# Patient Record
Sex: MALE | Race: WHITE | NOT HISPANIC OR LATINO | Employment: UNEMPLOYED | RURAL
[De-identification: names, ages, dates, MRNs, and addresses within clinical notes are randomized per-mention and may not be internally consistent; named-entity substitution may affect disease eponyms.]

---

## 2020-03-20 ENCOUNTER — HISTORICAL (OUTPATIENT)
Dept: ADMINISTRATIVE | Facility: HOSPITAL | Age: 55
End: 2020-03-20

## 2022-08-02 ENCOUNTER — LAB VISIT (OUTPATIENT)
Dept: LAB | Facility: HOSPITAL | Age: 57
End: 2022-08-02
Attending: NURSE PRACTITIONER
Payer: COMMERCIAL

## 2022-08-02 DIAGNOSIS — R53.83 FATIGUE, UNSPECIFIED TYPE: ICD-10-CM

## 2022-08-02 DIAGNOSIS — I10 HYPERTENSION, UNSPECIFIED TYPE: Primary | ICD-10-CM

## 2022-08-02 LAB
25(OH)D3 SERPL-MCNC: 32.8 NG/ML
ALBUMIN SERPL BCP-MCNC: 4.1 G/DL (ref 3.5–5)
ALBUMIN/GLOB SERPL: 0.8 {RATIO}
ALP SERPL-CCNC: 93 U/L (ref 45–115)
ALT SERPL W P-5'-P-CCNC: 130 U/L (ref 16–61)
ANION GAP SERPL CALCULATED.3IONS-SCNC: 14 MMOL/L (ref 7–16)
AST SERPL W P-5'-P-CCNC: 64 U/L (ref 15–37)
BASOPHILS # BLD AUTO: 0.04 K/UL (ref 0–0.2)
BASOPHILS NFR BLD AUTO: 0.4 % (ref 0–1)
BILIRUB SERPL-MCNC: 0.7 MG/DL (ref 0–1.2)
BUN SERPL-MCNC: 17 MG/DL (ref 7–18)
BUN/CREAT SERPL: 19 (ref 6–20)
CALCIUM SERPL-MCNC: 9.3 MG/DL (ref 8.5–10.1)
CHLORIDE SERPL-SCNC: 99 MMOL/L (ref 98–107)
CHOLEST SERPL-MCNC: 181 MG/DL (ref 0–200)
CHOLEST/HDLC SERPL: 3.9 {RATIO}
CO2 SERPL-SCNC: 29 MMOL/L (ref 21–32)
CREAT SERPL-MCNC: 0.91 MG/DL (ref 0.7–1.3)
DIFFERENTIAL METHOD BLD: ABNORMAL
EOSINOPHIL # BLD AUTO: 0.21 K/UL (ref 0–0.5)
EOSINOPHIL NFR BLD AUTO: 2 % (ref 1–4)
EOSINOPHIL NFR BLD MANUAL: 1 % (ref 1–4)
ERYTHROCYTE [DISTWIDTH] IN BLOOD BY AUTOMATED COUNT: 13.4 % (ref 11.5–14.5)
EST. AVERAGE GLUCOSE BLD GHB EST-MCNC: 254 MG/DL
GLOBULIN SER-MCNC: 4.9 G/DL (ref 2–4)
GLUCOSE SERPL-MCNC: 279 MG/DL (ref 74–106)
HBA1C MFR BLD HPLC: 10.2 % (ref 4.5–6.6)
HCT VFR BLD AUTO: 48.7 % (ref 40–54)
HDLC SERPL-MCNC: 46 MG/DL (ref 40–60)
HGB BLD-MCNC: 17.4 G/DL (ref 13.5–18)
LDLC SERPL CALC-MCNC: 104 MG/DL
LDLC/HDLC SERPL: 2.3 {RATIO}
LYMPHOCYTES # BLD AUTO: 2.74 K/UL (ref 1–4.8)
LYMPHOCYTES NFR BLD AUTO: 26.7 % (ref 27–41)
LYMPHOCYTES NFR BLD MANUAL: 23 % (ref 27–41)
MCH RBC QN AUTO: 28.7 PG (ref 27–31)
MCHC RBC AUTO-ENTMCNC: 35.7 G/DL (ref 32–36)
MCV RBC AUTO: 80.4 FL (ref 80–96)
MONOCYTES # BLD AUTO: 0.67 K/UL (ref 0–0.8)
MONOCYTES NFR BLD AUTO: 6.5 % (ref 2–6)
MONOCYTES NFR BLD MANUAL: 6 % (ref 2–6)
MPC BLD CALC-MCNC: 10.3 FL (ref 9.4–12.4)
NEUTROPHILS # BLD AUTO: 6.62 K/UL (ref 1.8–7.7)
NEUTROPHILS NFR BLD AUTO: 64.4 % (ref 53–65)
NEUTS SEG NFR BLD MANUAL: 70 % (ref 50–62)
NONHDLC SERPL-MCNC: 135 MG/DL
NRBC BLD MANUAL-RTO: ABNORMAL %
PLATELET # BLD AUTO: 223 K/UL (ref 150–400)
PLATELET MORPHOLOGY: NORMAL
POTASSIUM SERPL-SCNC: 4.1 MMOL/L (ref 3.5–5.1)
PROT SERPL-MCNC: 9 G/DL (ref 6.4–8.2)
RBC # BLD AUTO: 6.06 M/UL (ref 4.6–6.2)
RBC MORPH BLD: NORMAL
SODIUM SERPL-SCNC: 138 MMOL/L (ref 136–145)
TRIGL SERPL-MCNC: 157 MG/DL (ref 35–150)
TSH SERPL DL<=0.005 MIU/L-ACNC: 1.05 UIU/ML (ref 0.36–3.74)
VIT B12 SERPL-MCNC: 386 PG/ML (ref 193–986)
VLDLC SERPL-MCNC: 31 MG/DL
WBC # BLD AUTO: 10.28 K/UL (ref 4.5–11)

## 2022-08-02 PROCEDURE — 80061 LIPID PANEL: CPT

## 2022-08-02 PROCEDURE — 80053 COMPREHEN METABOLIC PANEL: CPT

## 2022-08-02 PROCEDURE — 36415 COLL VENOUS BLD VENIPUNCTURE: CPT

## 2022-08-02 PROCEDURE — 84443 ASSAY THYROID STIM HORMONE: CPT

## 2022-08-02 PROCEDURE — 82607 VITAMIN B-12: CPT

## 2022-08-02 PROCEDURE — 82306 VITAMIN D 25 HYDROXY: CPT

## 2022-08-02 PROCEDURE — 83036 HEMOGLOBIN GLYCOSYLATED A1C: CPT

## 2022-08-02 PROCEDURE — 85025 COMPLETE CBC W/AUTO DIFF WBC: CPT

## 2023-12-25 ENCOUNTER — HOSPITAL ENCOUNTER (EMERGENCY)
Facility: HOSPITAL | Age: 58
Discharge: HOME | End: 2023-12-25

## 2023-12-25 VITALS
OXYGEN SATURATION: 97 % | BODY MASS INDEX: 31.34 KG/M2 | DIASTOLIC BLOOD PRESSURE: 91 MMHG | HEART RATE: 77 BPM | RESPIRATION RATE: 18 BRPM | TEMPERATURE: 99 F | SYSTOLIC BLOOD PRESSURE: 135 MMHG | WEIGHT: 195 LBS | HEIGHT: 66 IN

## 2023-12-25 DIAGNOSIS — K80.20 CALCULUS OF GALLBLADDER WITHOUT CHOLECYSTITIS WITHOUT OBSTRUCTION: ICD-10-CM

## 2023-12-25 DIAGNOSIS — J20.9 ACUTE BRONCHITIS, UNSPECIFIED ORGANISM: ICD-10-CM

## 2023-12-25 DIAGNOSIS — K59.00 CONSTIPATION, UNSPECIFIED CONSTIPATION TYPE: ICD-10-CM

## 2023-12-25 DIAGNOSIS — R10.13 EPIGASTRIC ABDOMINAL PAIN: Primary | ICD-10-CM

## 2023-12-25 DIAGNOSIS — I10 HYPERTENSION: ICD-10-CM

## 2023-12-25 LAB
ALBUMIN SERPL BCP-MCNC: 3.3 G/DL (ref 3.5–5)
ALBUMIN/GLOB SERPL: 0.8 {RATIO}
ALP SERPL-CCNC: 72 U/L (ref 45–115)
ALT SERPL W P-5'-P-CCNC: 52 U/L (ref 16–61)
AMPHET UR QL SCN: POSITIVE
AMYLASE SERPL-CCNC: 29 U/L (ref 25–115)
ANION GAP SERPL CALCULATED.3IONS-SCNC: 13 MMOL/L (ref 7–16)
AST SERPL W P-5'-P-CCNC: 26 U/L (ref 15–37)
BACTERIA #/AREA URNS HPF: ABNORMAL /HPF
BARBITURATES UR QL SCN: NEGATIVE
BASOPHILS # BLD AUTO: 0.05 K/UL (ref 0–0.2)
BASOPHILS NFR BLD AUTO: 0.5 % (ref 0–1)
BENZODIAZ METAB UR QL SCN: NEGATIVE
BILIRUB SERPL-MCNC: 0.5 MG/DL (ref ?–1.2)
BILIRUB UR QL STRIP: NEGATIVE
BUN SERPL-MCNC: 14 MG/DL (ref 7–18)
BUN/CREAT SERPL: 14 (ref 6–20)
CALCIUM SERPL-MCNC: 8.4 MG/DL (ref 8.5–10.1)
CANNABINOIDS UR QL SCN: NEGATIVE
CHLORIDE SERPL-SCNC: 104 MMOL/L (ref 98–107)
CLARITY UR: CLEAR
CO2 SERPL-SCNC: 25 MMOL/L (ref 21–32)
COCAINE UR QL SCN: NEGATIVE
COLOR UR: YELLOW
CREAT SERPL-MCNC: 1.01 MG/DL (ref 0.7–1.3)
DIFFERENTIAL METHOD BLD: ABNORMAL
EGFR (NO RACE VARIABLE) (RUSH/TITUS): 87 ML/MIN/1.73M2
EOSINOPHIL # BLD AUTO: 0.34 K/UL (ref 0–0.5)
EOSINOPHIL NFR BLD AUTO: 3.4 % (ref 1–4)
ERYTHROCYTE [DISTWIDTH] IN BLOOD BY AUTOMATED COUNT: 13.4 % (ref 11.5–14.5)
ETHANOL SERPL-MCNC: 4 MG/DL
GLOBULIN SER-MCNC: 4.1 G/DL (ref 2–4)
GLUCOSE SERPL-MCNC: 165 MG/DL (ref 70–105)
GLUCOSE SERPL-MCNC: 199 MG/DL (ref 74–106)
GLUCOSE UR STRIP-MCNC: 500 MG/DL
HCT VFR BLD AUTO: 50 % (ref 40–54)
HGB BLD-MCNC: 16.5 G/DL (ref 13.5–18)
KETONES UR STRIP-SCNC: NEGATIVE MG/DL
LACTATE SERPL-SCNC: 1.7 MMOL/L (ref 0.4–2)
LACTATE SERPL-SCNC: 2.3 MMOL/L (ref 0.4–2)
LEUKOCYTE ESTERASE UR QL STRIP: NEGATIVE
LIPASE SERPL-CCNC: 40 U/L (ref 16–77)
LYMPHOCYTES # BLD AUTO: 2.62 K/UL (ref 1–4.8)
LYMPHOCYTES NFR BLD AUTO: 26.2 % (ref 27–41)
MAGNESIUM SERPL-MCNC: 2 MG/DL (ref 1.7–2.3)
MCH RBC QN AUTO: 27.5 PG (ref 27–31)
MCHC RBC AUTO-ENTMCNC: 33 G/DL (ref 32–36)
MCV RBC AUTO: 83.5 FL (ref 80–96)
MONOCYTES # BLD AUTO: 0.95 K/UL (ref 0–0.8)
MONOCYTES NFR BLD AUTO: 9.5 % (ref 2–6)
MPC BLD CALC-MCNC: 10.2 FL (ref 9.4–12.4)
MUCOUS THREADS #/AREA URNS HPF: ABNORMAL /HPF
NEUTROPHILS # BLD AUTO: 6.05 K/UL (ref 1.8–7.7)
NEUTROPHILS NFR BLD AUTO: 60.4 % (ref 53–65)
NITRITE UR QL STRIP: NEGATIVE
OPIATES UR QL SCN: POSITIVE
PCP UR QL SCN: NEGATIVE
PH UR STRIP: 5.5 PH UNITS
PLATELET # BLD AUTO: 229 K/UL (ref 150–400)
POTASSIUM SERPL-SCNC: 3.7 MMOL/L (ref 3.5–5.1)
PROT SERPL-MCNC: 7.4 G/DL (ref 6.4–8.2)
PROT UR QL STRIP: NEGATIVE
RBC # BLD AUTO: 5.99 M/UL (ref 4.6–6.2)
RBC # UR STRIP: ABNORMAL /UL
RBC #/AREA URNS HPF: ABNORMAL /HPF
SODIUM SERPL-SCNC: 138 MMOL/L (ref 136–145)
SP GR UR STRIP: 1.01
SQUAMOUS #/AREA URNS LPF: ABNORMAL /LPF
TROPONIN I SERPL DL<=0.01 NG/ML-MCNC: 5 PG/ML
TROPONIN I SERPL DL<=0.01 NG/ML-MCNC: 6.7 PG/ML
UROBILINOGEN UR STRIP-ACNC: 0.2 MG/DL
WBC # BLD AUTO: 10.01 K/UL (ref 4.5–11)
WBC #/AREA URNS HPF: ABNORMAL /HPF

## 2023-12-25 PROCEDURE — 94761 N-INVAS EAR/PLS OXIMETRY MLT: CPT

## 2023-12-25 PROCEDURE — 81001 URINALYSIS AUTO W/SCOPE: CPT

## 2023-12-25 PROCEDURE — 25500020 PHARM REV CODE 255

## 2023-12-25 PROCEDURE — 80307 DRUG TEST PRSMV CHEM ANLYZR: CPT

## 2023-12-25 PROCEDURE — 99284 PR EMERGENCY DEPT VISIT,LEVEL IV: ICD-10-PCS | Mod: ,,, | Performed by: NURSE PRACTITIONER

## 2023-12-25 PROCEDURE — 99284 EMERGENCY DEPT VISIT MOD MDM: CPT | Mod: ,,, | Performed by: NURSE PRACTITIONER

## 2023-12-25 PROCEDURE — 63600175 PHARM REV CODE 636 W HCPCS

## 2023-12-25 PROCEDURE — 82962 GLUCOSE BLOOD TEST: CPT

## 2023-12-25 PROCEDURE — 93010 EKG 12-LEAD: ICD-10-PCS | Mod: ,,, | Performed by: HOSPITALIST

## 2023-12-25 PROCEDURE — 93010 ELECTROCARDIOGRAM REPORT: CPT | Mod: ,,, | Performed by: HOSPITALIST

## 2023-12-25 PROCEDURE — 83605 ASSAY OF LACTIC ACID: CPT

## 2023-12-25 PROCEDURE — 82077 ASSAY SPEC XCP UR&BREATH IA: CPT

## 2023-12-25 PROCEDURE — 80053 COMPREHEN METABOLIC PANEL: CPT

## 2023-12-25 PROCEDURE — 96361 HYDRATE IV INFUSION ADD-ON: CPT

## 2023-12-25 PROCEDURE — 84484 ASSAY OF TROPONIN QUANT: CPT

## 2023-12-25 PROCEDURE — 82150 ASSAY OF AMYLASE: CPT

## 2023-12-25 PROCEDURE — 25000003 PHARM REV CODE 250

## 2023-12-25 PROCEDURE — 83690 ASSAY OF LIPASE: CPT

## 2023-12-25 PROCEDURE — 85025 COMPLETE CBC W/AUTO DIFF WBC: CPT

## 2023-12-25 PROCEDURE — 99285 EMERGENCY DEPT VISIT HI MDM: CPT | Mod: 25

## 2023-12-25 PROCEDURE — 83735 ASSAY OF MAGNESIUM: CPT

## 2023-12-25 PROCEDURE — 96374 THER/PROPH/DIAG INJ IV PUSH: CPT

## 2023-12-25 PROCEDURE — C9113 INJ PANTOPRAZOLE SODIUM, VIA: HCPCS

## 2023-12-25 PROCEDURE — 96375 TX/PRO/DX INJ NEW DRUG ADDON: CPT

## 2023-12-25 PROCEDURE — 93005 ELECTROCARDIOGRAM TRACING: CPT

## 2023-12-25 RX ORDER — ONDANSETRON 2 MG/ML
4 INJECTION INTRAMUSCULAR; INTRAVENOUS
Status: COMPLETED | OUTPATIENT
Start: 2023-12-25 | End: 2023-12-25

## 2023-12-25 RX ORDER — ONDANSETRON 4 MG/1
4 TABLET, ORALLY DISINTEGRATING ORAL EVERY 8 HOURS PRN
Qty: 10 TABLET | Refills: 0 | Status: SHIPPED | OUTPATIENT
Start: 2023-12-25

## 2023-12-25 RX ORDER — MORPHINE SULFATE 4 MG/ML
4 INJECTION, SOLUTION INTRAMUSCULAR; INTRAVENOUS
Status: COMPLETED | OUTPATIENT
Start: 2023-12-25 | End: 2023-12-25

## 2023-12-25 RX ORDER — LISINOPRIL 20 MG/1
20 TABLET ORAL DAILY
COMMUNITY

## 2023-12-25 RX ORDER — ALBUTEROL SULFATE 90 UG/1
1-2 AEROSOL, METERED RESPIRATORY (INHALATION) EVERY 4 HOURS PRN
Qty: 18 G | Refills: 0 | Status: SHIPPED | OUTPATIENT
Start: 2023-12-25 | End: 2024-12-24

## 2023-12-25 RX ORDER — PANTOPRAZOLE SODIUM 40 MG/10ML
40 INJECTION, POWDER, LYOPHILIZED, FOR SOLUTION INTRAVENOUS
Status: COMPLETED | OUTPATIENT
Start: 2023-12-25 | End: 2023-12-25

## 2023-12-25 RX ORDER — METFORMIN HYDROCHLORIDE 500 MG/1
500 TABLET ORAL 2 TIMES DAILY WITH MEALS
COMMUNITY

## 2023-12-25 RX ORDER — HYDROMORPHONE HYDROCHLORIDE 2 MG/ML
1 INJECTION, SOLUTION INTRAMUSCULAR; INTRAVENOUS; SUBCUTANEOUS
Status: COMPLETED | OUTPATIENT
Start: 2023-12-25 | End: 2023-12-25

## 2023-12-25 RX ADMIN — ONDANSETRON 4 MG: 2 INJECTION INTRAMUSCULAR; INTRAVENOUS at 05:12

## 2023-12-25 RX ADMIN — MORPHINE SULFATE 4 MG: 4 INJECTION, SOLUTION INTRAMUSCULAR; INTRAVENOUS at 05:12

## 2023-12-25 RX ADMIN — PANTOPRAZOLE SODIUM 40 MG: 40 INJECTION, POWDER, LYOPHILIZED, FOR SOLUTION INTRAVENOUS at 05:12

## 2023-12-25 RX ADMIN — SODIUM CHLORIDE 1000 ML: 9 INJECTION, SOLUTION INTRAVENOUS at 05:12

## 2023-12-25 RX ADMIN — HYDROMORPHONE HYDROCHLORIDE 1 MG: 2 INJECTION INTRAMUSCULAR; INTRAVENOUS; SUBCUTANEOUS at 05:12

## 2023-12-25 RX ADMIN — IOPAMIDOL 100 ML: 755 INJECTION, SOLUTION INTRAVENOUS at 06:12

## 2023-12-25 NOTE — ED PROVIDER NOTES
Encounter Date: 12/25/2023       History     Chief Complaint   Patient presents with    Abdominal Pain    Nausea    Shortness of Breath     Patient is a 58-year-old white male brought to the emergency department for evaluation of abdominal pain, nausea, shortness of breath that began approximately 2 hours prior to arrival.  He reports a similar episode a couple of weeks prior that resolved spontaneously 1 day later.  He did not seek evaluation at that time.  He describes the pain as a burning in his epigastric area that radiates downward.  He did attempt Mylanta prior to arrival to the ED with no relief.  He has a past medical history of diabetes and hypertension which are managed with daily medications.  He reports compliance with his medications.  Reports normal BM yesterday.  He does appear to be distressed.  His blood pressure is elevated at 206/137 in his right arm  and 198/135 in his left arm, heart rate is noted to be 66, respirations 22, and oxygen saturation 100% on room air.  He is afebrile with a temperature of 98.8°. Last PO intake was mylanta which the patient has been consuming since the onset of symptoms but last meal prior to that was chicken pot pie around midnight per patient.    The history is provided by the patient and a relative.     Review of patient's allergies indicates:   Allergen Reactions    Pcn [penicillins]      Past Medical History:   Diagnosis Date    Diabetes mellitus     Hypertension      History reviewed. No pertinent surgical history.  History reviewed. No pertinent family history.  Social History     Tobacco Use    Smoking status: Every Day     Current packs/day: 1.00     Types: Cigarettes    Smokeless tobacco: Never   Substance Use Topics    Drug use: Not Currently     Review of Systems   Constitutional:  Negative for activity change, appetite change, chills and fever.   HENT:  Negative for congestion and sore throat.    Eyes: Negative.    Respiratory:  Positive for shortness of  breath (when pain worsens). Negative for cough.    Cardiovascular:  Negative for chest pain.   Gastrointestinal:  Positive for abdominal distention (mild), abdominal pain (burning) and nausea. Negative for constipation, diarrhea and vomiting.   Endocrine: Negative.    Genitourinary:  Negative for dysuria, flank pain and frequency.   Musculoskeletal:  Negative for arthralgias, back pain, neck pain and neck stiffness.   Skin:  Negative for rash.   Allergic/Immunologic: Negative.    Neurological:  Negative for dizziness, facial asymmetry, speech difficulty, weakness, light-headedness, numbness and headaches.   Hematological:  Does not bruise/bleed easily.   Psychiatric/Behavioral:  Positive for agitation. Negative for confusion and hallucinations.    All other systems reviewed and are negative.      Physical Exam     Initial Vitals   BP Pulse Resp Temp SpO2   12/25/23 0452 12/25/23 0452 12/25/23 0452 12/25/23 0516 12/25/23 0452   (!) 206/137 66 (!) 24 98.8 °F (37.1 °C) 100 %      MAP       --                Physical Exam    Nursing note and vitals reviewed.  Constitutional: He appears well-developed and well-nourished.   HENT:   Head: Normocephalic and atraumatic.   Eyes: Conjunctivae and EOM are normal. Pupils are equal, round, and reactive to light.   Neck: Neck supple.   Normal range of motion.  Cardiovascular:  Normal rate, regular rhythm, normal heart sounds and intact distal pulses.           Pulmonary/Chest: Breath sounds normal. No respiratory distress. He has no wheezes. He has no rhonchi. He has no rales. He exhibits no tenderness.   Abdominal: Bowel sounds are normal. He exhibits distension (mild). There is abdominal tenderness.   Musculoskeletal:         General: Normal range of motion.      Cervical back: Normal range of motion and neck supple.     Neurological: He is alert and oriented to person, place, and time. He has normal strength. GCS score is 15. GCS eye subscore is 4. GCS verbal subscore is 5. GCS  motor subscore is 6.   Skin: Skin is warm and dry. Capillary refill takes less than 2 seconds.   Psychiatric: He has a normal mood and affect. His behavior is normal. Judgment and thought content normal.         Medical Screening Exam   See Full Note    ED Course   Procedures  Labs Reviewed   COMPREHENSIVE METABOLIC PANEL - Abnormal; Notable for the following components:       Result Value    Glucose 199 (*)     Calcium 8.4 (*)     Albumin 3.3 (*)     Globulin 4.1 (*)     All other components within normal limits   URINALYSIS, REFLEX TO URINE CULTURE - Abnormal; Notable for the following components:    Glucose,  (*)     Blood, UA Trace-Intact (*)     All other components within normal limits   DRUG SCREEN, URINE (BEAKER) - Abnormal; Notable for the following components:    Opiates, Urine Positive (*)     Amphetamine, Urine Positive (*)     All other components within normal limits   LACTIC ACID, PLASMA - Abnormal; Notable for the following components:    Lactic Acid 2.3 (*)     All other components within normal limits   CBC WITH DIFFERENTIAL - Abnormal; Notable for the following components:    Lymphocytes % 26.2 (*)     Monocytes % 9.5 (*)     Monocytes, Absolute 0.95 (*)     All other components within normal limits   URINALYSIS, MICROSCOPIC - Abnormal; Notable for the following components:    RBC, UA 3-5 (*)     Bacteria, UA Few (*)     Squamous Epithelial Cells, UA Few (*)     Mucus, UA Few (*)     All other components within normal limits   POCT GLUCOSE MONITORING CONTINUOUS - Abnormal; Notable for the following components:    POC Glucose 165 (*)     All other components within normal limits   AMYLASE - Normal   LIPASE - Normal   MAGNESIUM - Normal   TROPONIN I - Normal   ALCOHOL,MEDICAL (ETHANOL) - Normal   LACTIC ACID, PLASMA - Normal   TROPONIN I - Normal   CBC W/ AUTO DIFFERENTIAL    Narrative:     The following orders were created for panel order CBC auto differential.  Procedure                                Abnormality         Status                     ---------                               -----------         ------                     CBC with Differential[[id number]]       Abnormal            Final result                 Please view results for these tests on the individual orders.   POCT GLUCOSE MONITORING CONTINUOUS          Imaging Results              CT Abdomen Pelvis With IV Contrast NO Oral Contrast (Final result)  Result time 12/25/23 09:00:38      Final result by Kranthi Salcido MD (12/25/23 09:00:38)                   Impression:      1. No acute abnormality within the abdomen or pelvis to explain patient's reported complaints.  Mild fecal stasis/constipation changes are suspected.    2.  Cholelithiasis with no CT evidence of acute cholecystitis.    Place of service: [facility]      Electronically signed by: Kranthi Salcido  Date:    12/25/2023  Time:    09:00               Narrative:    EXAMINATION  CT ABDOMEN PELVIS WITH IV CONTRAST    CLINICAL HISTORY:  Epigastric pain;    TECHNIQUE:  3 mm axial images were obtained from the lung bases through the ischial tuberosities status post administration of 100 cc of Isovue-370. No immediate complication from administration of contrast media. Coronal and sagittal reformatted images were additionally created and submitted for review. Total DLP: 1875 mGy/cm.    Dose reduction:    The CT exam was performed using one or more dose reduction techniques: Automatic exposure control, automated adjustment of the MA and/or kVP according to patient size, or use of iterative reconstruction technique.    COMPARISON:  None    FINDINGS:  Lung bases: Lung bases are clear.  No significant pleural/pericardial effusion.    Abdomen:    Liver and Gallbladder: No abnormal enhancement.  No biliary ductal dilatation.  Gallstones are noted within the dependent gallbladder.  No wall thickening or pericholecystic fluid.  Portal vein is patent.    Spleen: Spleen is mildly  enlarged but otherwise grossly unremarkable as visualized.    Pancreas: Unremarkable    Adrenals: Unremarkable    Kidneys: No abnormal enhancement.  No hydronephrosis or nephrolithiasis peer grossly symmetric excretion of contrast is noted on delayed phase images.    Bowel and Mesentery: No abnormal small bowel dilatation.  No free fluid/air.  Scattered fecal material is noted throughout the colon which is otherwise unremarkable in course/caliber.  The appendix is not definitely identified.  No secondary signs of acute appendicitis.    Retroperitoneum: No enlarged lymph nodes.    Pelvis:    Bladder: No focal abnormality.    Fluid: No free fluid identified.    Lymph nodes: No enlarged lymph nodes.    Pelvic organs: Unremarkable    Osseous structures: No suspicious appearing osseous abnormalities noted.                                       X-Ray Chest AP Portable (Final result)  Result time 12/25/23 08:58:35      Final result by Kranthi Salcido MD (12/25/23 08:58:35)                   Impression:      No definite focal consolidation.  Minimal perihilar infectious/inflammatory infiltrates and/or atelectasis cannot be entirely excluded.  Mild bronchitis or other viral/atypical infectious/inflammatory process is suspected.    Place of service: [facility]      Electronically signed by: Kranthi Salcido  Date:    12/25/2023  Time:    08:58               Narrative:    EXAMINATION:  XR CHEST AP PORTABLE    CLINICAL HISTORY:  epigastric pain;    TECHNIQUE:  Portable    COMPARISON:  Prior radiograph dated 11/23/2018    FINDINGS:  The cardiomediastinal silhouette is within normal limits. Lungs are predominantly clear.  Minimal right perihilar interstitial prominence is noted which is nonspecific.  There is no pneumothorax or pleural effusion.    There is no acute osseous or soft tissue abnormality.                                    X-Rays:   Independently Interpreted Readings:   Chest X-Ray: No definite focal  consolidation.  Minimal perihilar infectious/inflammatory infiltrates and/or atelectasis cannot be entirely excluded.  Mild bronchitis or other viral/atypical infectious/inflammatory process is suspected   Abdomen: 1. No acute abnormality within the abdomen or pelvis to explain patient's reported complaints.  Mild fecal stasis/constipation changes are suspected.     2.  Cholelithiasis with no CT evidence of acute cholecystitis       Medications   sodium chloride 0.9% bolus 1,000 mL 1,000 mL (0 mLs Intravenous Stopped 12/25/23 0600)   morphine injection 4 mg (4 mg Intravenous Given 12/25/23 0512)   ondansetron injection 4 mg (4 mg Intravenous Given 12/25/23 0512)   pantoprazole injection 40 mg (40 mg Intravenous Given 12/25/23 0530)   HYDROmorphone (PF) injection 1 mg (1 mg Intravenous Given 12/25/23 0557)   iopamidoL (ISOVUE-370) injection 100 mL (100 mLs Intravenous Given 12/25/23 0625)     Medical Decision Making  Patient presents for abdominal pain, nausea, and shortness of breath.  He reports sudden onset approximately 2 hours prior to arrival.  He does have a longstanding history of GERD and is experiencing epigastric pain with a sudden onset.  Pain is reported as a burning sensation.  He is tender to palpation in the epigastric region.  He also reports some nausea but has not had any vomiting or diarrhea.  We will obtain an EKG for further evaluation due to the location of the epigastric pain.  We will also initiate IV access and obtain lab specimens for further evaluation.  We will provide him with Zofran 4 mg IV for nausea control and attempt pain control with morphine 4 mg IV.  Differentials include ulcer/perforation so we will also provide the patient with Protonix 40 mg IV.  We will obtain a chest x-ray for initial imaging would likely plans to perform CT scan of the abdomen and pelvis with contrast for further evaluation.    Amount and/or Complexity of Data Reviewed  Independent Historian:      Details:  Patient is a 58-year-old white male brought to the emergency department for evaluation of abdominal pain, nausea, shortness of breath that began approximately 2 hours prior to arrival.  He reports a similar episode a couple of weeks prior that resolved spontaneously 1 day later.  He did not seek evaluation at that time.  He describes the pain as a burning in his epigastric area that radiates downward.  He did attempt Mylanta prior to arrival to the ED with no relief.  He has a past medical history of diabetes and hypertension which are managed with daily medications.  He reports compliance with his medications.  Reports normal BM yesterday.  He does appear to be distressed.  His blood pressure is elevated at 206/137 in his right arm  and 198/135 in his left arm, heart rate is noted to be 66, respirations 22, and oxygen saturation 100% on room air.  He is afebrile with a temperature of 98.8°. Last PO intake was mylanta which the patient has been consuming since the onset of symptoms but last meal prior to that was chicken pot pie around midnight per patient.  Labs: ordered. Decision-making details documented in ED Course.     Details: POCT glucose 165, CBC shows a WBC of 10.01, H&H of 16.5 and 50, platelet count 229, initial lactic acid 2.3 (patient is receiving IV fluids currently), amylase normal at 29, lipase normal at 40, magnesium normal at 2.0, ethanol negative at 4, high sensitivity troponin negative at 6.7, CMP shows a glucose of 199, calcium 8.4, albumin of 3.3, and globulin of 4.1 but otherwise unremarkable.  Normal creatinine of 1.01 with a BUN of 14, and GFR of 87.   Radiology: ordered.     Details: CXR shows no definite focal consolidation.  Minimal perihilar infectious/inflammatory infiltrates and/or atelectasis cannot be entirely excluded.  Mild bronchitis or other viral/atypical infectious/inflammatory process is suspected.    CT abd pelvis with contrast shows evidence of constipation and  "cholelithiasis without cholecystitis.  ECG/medicine tests: ordered.     Details: EKG shows sinus bradycardia at a rate of 59 beats per minute.  No STEMI.    Risk  Prescription drug management.  Risk Details: Ns bolus x 1 liter. MS 4 mg IVP, Ondansetron 4 mg IVP, Dilaudid 1 mg IVP, protonix 40 mg IVP given. Symptoms improved. Discussed findings with pt and wife. Instructed to follow-up with PCP for further evaluation and referral for cholelithiasis. Rx for ondansetron and albuterol MDI. Pt is stable. BP mproved 139/97, 98.8, 75,20, 97% O2 sat. Discharged home with detailed instructions and return precautions provided.               ED Course as of 12/25/23 0929   Mon Dec 25, 2023   0548 Normal creatinine received.  [facility] consulted for approval of CT scan of the abdomen and pelvis with IV contrast.  Approval granted by Dr. Allen. [MC]   0553 Patient informed about the need for imaging utilizing a CT of the abdomen and pelvis with contrast.  He verbalizes understanding and is in agreement with this plan.  He does report minimal improvement in his pain and reports that the pain is still intense at times.  Still described as a burning in his epigastric area.  We will attempt Dilaudid 1 mg IV for pain control at this time. [MC]   0619 Patient returned from CT scan.  Reports improvement in his pain after IV Dilaudid.  Previous pain score was a 10 and now rated at a 5.  Awaiting Radiology reports and urine results. [MC]   0700 Report from LAZARUS Rangel. [DP]   0730 Pt remains alert and oriented. He reports that pain has improved. Denies any n/v. Awaiting CT results. [DP]   0731 Lactate, Mateo: 1.7 [DP]   0731 Troponin I High Sensitivity: 5.0 [DP]   0915 Results discussed with pt and wife. Instructed on need for laxative and follow-up with PCP for GB ultrasound and referral to general surgeon for further evaluation. Explained that pain is likely a "gall bladder attack." Pt reports he had a similar episode of pain about 2 weeks " ago that was not as severe or lengthy. Also discussed CXR. Pt is a smoker. Wife states that he just completed antibiotics for URI last week. Notes has improved except for persistent cough. Pt is afebrile. Rx for ondansetron and albuterol MDI. [DP]      ED Course User Index  [DP] Kandi Santa NP  [MC] Santana Rangel FNP                             Clinical Impression:   Final diagnoses:  [I10] Hypertension  [J20.9] Acute bronchitis, unspecified organism  [K80.20] Calculus of gallbladder without cholecystitis without obstruction  [K59.00] Constipation, unspecified constipation type  [R10.13] Epigastric abdominal pain (Primary)        ED Disposition Condition    Discharge           ED Prescriptions       Medication Sig Dispense Start Date End Date Auth. Provider    ondansetron (ZOFRAN-ODT) 4 MG TbDL Take 1 tablet (4 mg total) by mouth every 8 (eight) hours as needed. 10 tablet 12/25/2023 -- Kandi Santa NP    albuterol (PROVENTIL/VENTOLIN HFA) 90 mcg/actuation inhaler Inhale 1-2 puffs into the lungs every 4 (four) hours as needed for Wheezing (or persistent cough). Rescue 18 g 12/25/2023 12/24/2024 Kandi Santa NP          Follow-up Information       Follow up With Specialties Details Why Contact Info    Delmar Molina FNP-C Family Medicine In 2 days  [address] [facility] [address] [phone]      [facility] - Emergency Department Emergency Medicine  If symptoms worsen [address] [id number] [phone]             Kandi Santa NP  12/25/23 5202

## 2023-12-25 NOTE — DISCHARGE INSTRUCTIONS
Use albuterol inhaler as prescribed for persistent cough or wheezing. Take a laxative of your choosing today and repeat dose tomorrow. Drink plenty of liquids. Since you have gall stones and your pain may have been due to the stones, do not eat any fatty or fried foods. Follow-up with your PCP in 1-2 days for referral and ultrasound. Return to the ED for new or worsening symptoms.

## 2023-12-25 NOTE — LETTER
Patient: Fredy Garcia  YOB: 1965  Date: 12/25/2023 Time: 8:49 AM  Location: [facility] - Emergency Department    Leaving the Hospital Against Medical Advice    Chart #:[id number]    This will certify that I, the undersigned,    ______________________________________________________________________    A patient in the above named medical center, having requested discharge and removal from the medical [place] against the advice of my attending physician(s), hereby release [facility], its physicians, officers and employees, severally and individually, from any and all liability of any nature whatsoever for any injury or harm or complication of any kind that may result directly or indirectly, by reason of my terminating my stay as a patient at [facility] - Emergency Department and my departure from [facility], and hereby waive any and all rights of action I may now have or later acquire as a result of my voluntary departure from [facility] and the termination of my stay as a patient therein.    This release is made with the full knowledge of the danger that may result from the action which I am taking.      Date:_______________________                         ___________________________                                                                                    Patient/Legal Representative    Witness:        ____________________________                          ___________________________  Nurse                                                                        Physician

## 2023-12-26 ENCOUNTER — HOSPITAL ENCOUNTER (EMERGENCY)
Facility: HOSPITAL | Age: 58
Discharge: HOME | End: 2023-12-26

## 2023-12-26 VITALS
HEART RATE: 67 BPM | OXYGEN SATURATION: 99 % | HEIGHT: 69 IN | TEMPERATURE: 98 F | DIASTOLIC BLOOD PRESSURE: 119 MMHG | BODY MASS INDEX: 29.92 KG/M2 | SYSTOLIC BLOOD PRESSURE: 198 MMHG | WEIGHT: 202 LBS | RESPIRATION RATE: 20 BRPM

## 2023-12-26 DIAGNOSIS — R10.13 EPIGASTRIC ABDOMINAL PAIN: Primary | ICD-10-CM

## 2023-12-26 DIAGNOSIS — K80.20 CALCULUS OF GALLBLADDER WITHOUT CHOLECYSTITIS WITHOUT OBSTRUCTION: ICD-10-CM

## 2023-12-26 LAB
ALBUMIN SERPL BCP-MCNC: 3.1 G/DL (ref 3.5–5)
ALBUMIN/GLOB SERPL: 0.8 {RATIO}
ALP SERPL-CCNC: 74 U/L (ref 45–115)
ALT SERPL W P-5'-P-CCNC: 51 U/L (ref 16–61)
AMYLASE SERPL-CCNC: 33 U/L (ref 25–115)
ANION GAP SERPL CALCULATED.3IONS-SCNC: 12 MMOL/L (ref 7–16)
AST SERPL W P-5'-P-CCNC: 23 U/L (ref 15–37)
BASOPHILS # BLD AUTO: 0.02 K/UL (ref 0–0.2)
BASOPHILS NFR BLD AUTO: 0.2 % (ref 0–1)
BILIRUB SERPL-MCNC: 0.4 MG/DL (ref ?–1.2)
BUN SERPL-MCNC: 8 MG/DL (ref 7–18)
BUN/CREAT SERPL: 8 (ref 6–20)
CALCIUM SERPL-MCNC: 8.4 MG/DL (ref 8.5–10.1)
CHLORIDE SERPL-SCNC: 104 MMOL/L (ref 98–107)
CO2 SERPL-SCNC: 26 MMOL/L (ref 21–32)
CREAT SERPL-MCNC: 1.02 MG/DL (ref 0.7–1.3)
DIFFERENTIAL METHOD BLD: ABNORMAL
EGFR (NO RACE VARIABLE) (RUSH/TITUS): 86 ML/MIN/1.73M2
EOSINOPHIL # BLD AUTO: 0.28 K/UL (ref 0–0.5)
EOSINOPHIL NFR BLD AUTO: 3 % (ref 1–4)
ERYTHROCYTE [DISTWIDTH] IN BLOOD BY AUTOMATED COUNT: 13.2 % (ref 11.5–14.5)
GLOBULIN SER-MCNC: 4 G/DL (ref 2–4)
GLUCOSE SERPL-MCNC: 183 MG/DL (ref 74–106)
HCT VFR BLD AUTO: 46.3 % (ref 40–54)
HGB BLD-MCNC: 15.3 G/DL (ref 13.5–18)
LIPASE SERPL-CCNC: 39 U/L (ref 16–77)
LYMPHOCYTES # BLD AUTO: 2.62 K/UL (ref 1–4.8)
LYMPHOCYTES NFR BLD AUTO: 28.1 % (ref 27–41)
MCH RBC QN AUTO: 27.7 PG (ref 27–31)
MCHC RBC AUTO-ENTMCNC: 33 G/DL (ref 32–36)
MCV RBC AUTO: 83.9 FL (ref 80–96)
MONOCYTES # BLD AUTO: 0.86 K/UL (ref 0–0.8)
MONOCYTES NFR BLD AUTO: 9.2 % (ref 2–6)
MPC BLD CALC-MCNC: 10.2 FL (ref 9.4–12.4)
NEUTROPHILS # BLD AUTO: 5.55 K/UL (ref 1.8–7.7)
NEUTROPHILS NFR BLD AUTO: 59.5 % (ref 53–65)
PLATELET # BLD AUTO: 195 K/UL (ref 150–400)
POTASSIUM SERPL-SCNC: 3.9 MMOL/L (ref 3.5–5.1)
PROT SERPL-MCNC: 7.1 G/DL (ref 6.4–8.2)
RBC # BLD AUTO: 5.52 M/UL (ref 4.6–6.2)
SODIUM SERPL-SCNC: 138 MMOL/L (ref 136–145)
WBC # BLD AUTO: 9.33 K/UL (ref 4.5–11)

## 2023-12-26 PROCEDURE — 96374 THER/PROPH/DIAG INJ IV PUSH: CPT

## 2023-12-26 PROCEDURE — 96375 TX/PRO/DX INJ NEW DRUG ADDON: CPT

## 2023-12-26 PROCEDURE — 82150 ASSAY OF AMYLASE: CPT | Performed by: NURSE PRACTITIONER

## 2023-12-26 PROCEDURE — 80053 COMPREHEN METABOLIC PANEL: CPT | Performed by: NURSE PRACTITIONER

## 2023-12-26 PROCEDURE — 85025 COMPLETE CBC W/AUTO DIFF WBC: CPT | Performed by: NURSE PRACTITIONER

## 2023-12-26 PROCEDURE — 63600175 PHARM REV CODE 636 W HCPCS: Performed by: NURSE PRACTITIONER

## 2023-12-26 PROCEDURE — 25000003 PHARM REV CODE 250: Performed by: NURSE PRACTITIONER

## 2023-12-26 PROCEDURE — 99284 EMERGENCY DEPT VISIT MOD MDM: CPT | Mod: ,,, | Performed by: NURSE PRACTITIONER

## 2023-12-26 PROCEDURE — 99284 EMERGENCY DEPT VISIT MOD MDM: CPT | Mod: 25

## 2023-12-26 PROCEDURE — 96361 HYDRATE IV INFUSION ADD-ON: CPT

## 2023-12-26 PROCEDURE — 83690 ASSAY OF LIPASE: CPT | Performed by: NURSE PRACTITIONER

## 2023-12-26 RX ORDER — ONDANSETRON 2 MG/ML
4 INJECTION INTRAMUSCULAR; INTRAVENOUS
Status: COMPLETED | OUTPATIENT
Start: 2023-12-26 | End: 2023-12-26

## 2023-12-26 RX ORDER — HYDROMORPHONE HYDROCHLORIDE 2 MG/ML
1 INJECTION, SOLUTION INTRAMUSCULAR; INTRAVENOUS; SUBCUTANEOUS
Status: COMPLETED | OUTPATIENT
Start: 2023-12-26 | End: 2023-12-26

## 2023-12-26 RX ADMIN — ONDANSETRON 4 MG: 2 INJECTION INTRAMUSCULAR; INTRAVENOUS at 12:12

## 2023-12-26 RX ADMIN — HYDROMORPHONE HYDROCHLORIDE 1 MG: 2 INJECTION INTRAMUSCULAR; INTRAVENOUS; SUBCUTANEOUS at 12:12

## 2023-12-26 RX ADMIN — SODIUM CHLORIDE 1000 ML: 9 INJECTION, SOLUTION INTRAVENOUS at 12:12

## 2023-12-26 NOTE — DISCHARGE INSTRUCTIONS
Drink plenty of liquids. Maintain a low fat diet as outlined on attached instructions. Follow-up with your PCP today for ultrasound and referral to surgeon. Return to the ED for new or worsening symptoms.

## 2023-12-26 NOTE — ED PROVIDER NOTES
Encounter Date: 12/26/2023       History     Chief Complaint   Patient presents with    Abdominal Pain     Pt c/o mid upper abd pain. Pt was seen this AM for the same complaint.     Presented with wife c/o recurrence of epigastric pain that started at midnight tonight. Was evaluated this am in the ED with same symptoms, was diagnosed with constipation/fecal stasis and cholelithasis without cholecystitis, and discharged home on gall bladder diet and to follow-up with PCP this week for OP ultrasound and referral to surgeon. States after getting home he went to bed and stayed there all day. He reports eating chicken, brownie and jhonny crackers around 1300. He states that he has had several BMs today without taking a laxative. He denies any n/v or fever. States did not take his BP today after getting home from ED this am.      Review of patient's allergies indicates:   Allergen Reactions    Pcn [penicillins]      Past Medical History:   Diagnosis Date    Diabetes mellitus     Hypertension      History reviewed. No pertinent surgical history.  History reviewed. No pertinent family history.  Social History     Tobacco Use    Smoking status: Every Day     Current packs/day: 1.00     Types: Cigarettes    Smokeless tobacco: Never   Substance Use Topics    Alcohol use: Never    Drug use: Not Currently     Review of Systems   Constitutional:  Positive for activity change. Negative for fever.   HENT: Negative.     Respiratory:  Negative for cough, chest tightness, shortness of breath and wheezing.    Cardiovascular:  Negative for chest pain and palpitations.   Gastrointestinal:  Positive for abdominal pain. Negative for blood in stool, constipation, diarrhea, nausea and vomiting.   Genitourinary:  Negative for difficulty urinating.   Musculoskeletal:  Negative for back pain.   Skin: Negative.    Neurological: Negative.    Psychiatric/Behavioral: Negative.         Physical Exam     Initial Vitals [12/26/23 0022]   BP Pulse Resp  Temp SpO2   (!) 198/119 67 20 97.7 °F (36.5 °C) 99 %      MAP       --         Physical Exam    Nursing note and vitals reviewed.  Constitutional: He appears well-developed and well-nourished.   HENT:   Head: Normocephalic.   Mouth/Throat: Oropharynx is clear and moist.   Eyes: EOM are normal.   Neck: Neck supple.   Normal range of motion.  Cardiovascular:  Normal rate, regular rhythm, normal heart sounds and intact distal pulses.           No murmur heard.  Pulmonary/Chest: Breath sounds normal. He has no wheezes. He has no rhonchi. He has no rales.   Abdominal: Abdomen is soft. Bowel sounds are normal. He exhibits no distension and no mass. There is abdominal tenderness (mild to epigastric area). There is no rebound and no guarding.   Musculoskeletal:         General: Normal range of motion.      Cervical back: Normal range of motion and neck supple.     Neurological: He is alert and oriented to person, place, and time. He has normal strength. GCS score is 15. GCS eye subscore is 4. GCS verbal subscore is 5. GCS motor subscore is 6.   Skin: Skin is warm and dry. Capillary refill takes less than 2 seconds.   Psychiatric: He has a normal mood and affect.         Medical Screening Exam   See Full Note    ED Course   Procedures  Labs Reviewed   COMPREHENSIVE METABOLIC PANEL - Abnormal; Notable for the following components:       Result Value    Glucose 183 (*)     Calcium 8.4 (*)     Albumin 3.1 (*)     All other components within normal limits   CBC WITH DIFFERENTIAL - Abnormal; Notable for the following components:    Monocytes % 9.2 (*)     Monocytes, Absolute 0.86 (*)     All other components within normal limits   LIPASE - Normal   AMYLASE - Normal   CBC W/ AUTO DIFFERENTIAL    Narrative:     The following orders were created for panel order CBC auto differential.  Procedure                               Abnormality         Status                     ---------                               -----------          ------                     CBC with Differential[[id number]]       Abnormal            Final result                 Please view results for these tests on the individual orders.          Imaging Results    None          Medications   sodium chloride 0.9% bolus 1,000 mL 1,000 mL (0 mLs Intravenous Stopped 12/26/23 0126)   HYDROmorphone (PF) injection 1 mg (1 mg Intravenous Given 12/26/23 0043)   ondansetron injection 4 mg (4 mg Intravenous Given 12/26/23 0043)     Medical Decision Making  Presented with 1st cousin c/o recurrence of epigastric pain that started at midnight tonight. Was evaluated this am in the ED with same symptoms, was diagnosed with constipation/fecal stasis and cholelithasis without cholecystitis, and discharged home on gall bladder diet and to follow-up with PCP this week for OP ultrasound and referral to surgeon. States after getting home he went to bed and stayed there all day. He reports eating chicken, brownie and jhonny crackers around 1300. He states that he has had several BMs today without taking a laxative. He denies any n/v or fever.    Amount and/or Complexity of Data Reviewed  External Data Reviewed: labs.     Details: Reviewed previous labs from earlier visit today.  Labs: ordered. Decision-making details documented in ED Course.     Details: ALT 51, AST 23, lipase 39, amylase 33, TBR 0.4, Bun 8, creatinine 1.02, WBC 9330 with H&H 15.3 and 46.3, plt [id number].    Risk  Prescription drug management.  Risk Details: Ns bolus 1 liter, Dilaudid 1 mg IVP, ondansetron 4 mg IVP given in the ED. Reports immediate pain relief. Discussed labs with pt and family. Instructed on gall bladder diet and need for follow-up for ultrasound and evaluation by Surgeon. Instructed to take his BP meds upon arrival home. Discharged home with detailed diet, follow-up and return precautions provided.               ED Course as of 12/26/23 0127 Tue Dec 26, 2023   0050 WBC: 9.33 [DP]   0050 Hemoglobin: 15.3 [DP]    0050 Hematocrit: 46.3 [DP]   0050 Platelet Count: 195 [DP]   0050 Neutrophils Relative: 59.5 [DP]   0056 Sodium: 138 [DP]   0056 Potassium: 3.9 [DP]   0056 BUN: 8 [DP]   0056 Creatinine: 1.02 [DP]   0056 ALT: 51 [DP]   0056 AST: 23 [DP]   0056 BILIRUBIN TOTAL: 0.4 [DP]   0103 Amylase: 33 [DP]   0103 Lipase: 39 [DP]      ED Course User Index  [DP] Kandi Santa NP                           Clinical Impression:   Final diagnoses:  [R10.13] Epigastric abdominal pain (Primary)  [K80.20] Calculus of gallbladder without cholecystitis without obstruction        ED Disposition Condition    Discharge Stable          ED Prescriptions    None       Follow-up Information       Follow up With Specialties Details Why Contact Info    Delmar Molina, CYNTHIAP-C [facility]  [address] [facility] [place] [address]  [phone]               Kandi Santa NP  12/26/23 9088

## 2024-04-29 ENCOUNTER — HOSPITAL ENCOUNTER (EMERGENCY)
Facility: HOSPITAL | Age: 59
Discharge: HOME | End: 2024-04-30

## 2024-04-29 DIAGNOSIS — K80.20 GALLSTONES: Primary | ICD-10-CM

## 2024-04-29 LAB
ALBUMIN SERPL BCP-MCNC: 3.8 G/DL (ref 3.5–5)
ALBUMIN/GLOB SERPL: 0.8 {RATIO}
ALP SERPL-CCNC: 73 U/L (ref 45–115)
ALT SERPL W P-5'-P-CCNC: 66 U/L (ref 16–61)
ANION GAP SERPL CALCULATED.3IONS-SCNC: 14 MMOL/L (ref 7–16)
AST SERPL W P-5'-P-CCNC: 31 U/L (ref 15–37)
BASOPHILS # BLD AUTO: 0.04 K/UL (ref 0–0.2)
BASOPHILS NFR BLD AUTO: 0.4 % (ref 0–1)
BILIRUB SERPL-MCNC: 0.4 MG/DL (ref ?–1.2)
BUN SERPL-MCNC: 11 MG/DL (ref 7–18)
BUN/CREAT SERPL: 10 (ref 6–20)
CALCIUM SERPL-MCNC: 9.1 MG/DL (ref 8.5–10.1)
CHLORIDE SERPL-SCNC: 100 MMOL/L (ref 98–107)
CO2 SERPL-SCNC: 28 MMOL/L (ref 21–32)
CREAT SERPL-MCNC: 1.09 MG/DL (ref 0.7–1.3)
DIFFERENTIAL METHOD BLD: ABNORMAL
EGFR (NO RACE VARIABLE) (RUSH/TITUS): 79 ML/MIN/1.73M2
EOSINOPHIL # BLD AUTO: 0.36 K/UL (ref 0–0.5)
EOSINOPHIL NFR BLD AUTO: 3.3 % (ref 1–4)
ERYTHROCYTE [DISTWIDTH] IN BLOOD BY AUTOMATED COUNT: 13.2 % (ref 11.5–14.5)
GLOBULIN SER-MCNC: 4.8 G/DL (ref 2–4)
GLUCOSE SERPL-MCNC: 133 MG/DL (ref 74–106)
HCT VFR BLD AUTO: 49.8 % (ref 40–54)
HGB BLD-MCNC: 17.1 G/DL (ref 13.5–18)
LIPASE SERPL-CCNC: 49 U/L (ref 16–77)
LYMPHOCYTES # BLD AUTO: 3.93 K/UL (ref 1–4.8)
LYMPHOCYTES NFR BLD AUTO: 36.2 % (ref 27–41)
MCH RBC QN AUTO: 28.1 PG (ref 27–31)
MCHC RBC AUTO-ENTMCNC: 34.3 G/DL (ref 32–36)
MCV RBC AUTO: 81.9 FL (ref 80–96)
MONOCYTES # BLD AUTO: 0.95 K/UL (ref 0–0.8)
MONOCYTES NFR BLD AUTO: 8.7 % (ref 2–6)
MPC BLD CALC-MCNC: 10.2 FL (ref 9.4–12.4)
NEUTROPHILS # BLD AUTO: 5.59 K/UL (ref 1.8–7.7)
NEUTROPHILS NFR BLD AUTO: 51.4 % (ref 53–65)
PLATELET # BLD AUTO: 223 K/UL (ref 150–400)
POTASSIUM SERPL-SCNC: 4 MMOL/L (ref 3.5–5.1)
PROT SERPL-MCNC: 8.6 G/DL (ref 6.4–8.2)
RBC # BLD AUTO: 6.08 M/UL (ref 4.6–6.2)
SODIUM SERPL-SCNC: 138 MMOL/L (ref 136–145)
WBC # BLD AUTO: 10.87 K/UL (ref 4.5–11)

## 2024-04-29 PROCEDURE — 25000003 PHARM REV CODE 250: Performed by: NURSE PRACTITIONER

## 2024-04-29 PROCEDURE — 99285 EMERGENCY DEPT VISIT HI MDM: CPT | Mod: 25

## 2024-04-29 PROCEDURE — 63600175 PHARM REV CODE 636 W HCPCS: Performed by: NURSE PRACTITIONER

## 2024-04-29 PROCEDURE — 80053 COMPREHEN METABOLIC PANEL: CPT | Performed by: NURSE PRACTITIONER

## 2024-04-29 PROCEDURE — 83690 ASSAY OF LIPASE: CPT | Performed by: NURSE PRACTITIONER

## 2024-04-29 PROCEDURE — 85025 COMPLETE CBC W/AUTO DIFF WBC: CPT | Performed by: NURSE PRACTITIONER

## 2024-04-29 PROCEDURE — 99284 EMERGENCY DEPT VISIT MOD MDM: CPT | Mod: ,,, | Performed by: NURSE PRACTITIONER

## 2024-04-29 PROCEDURE — 96374 THER/PROPH/DIAG INJ IV PUSH: CPT

## 2024-04-29 PROCEDURE — 96361 HYDRATE IV INFUSION ADD-ON: CPT

## 2024-04-29 RX ORDER — ONDANSETRON HYDROCHLORIDE 2 MG/ML
4 INJECTION, SOLUTION INTRAVENOUS
Status: COMPLETED | OUTPATIENT
Start: 2024-04-29 | End: 2024-04-29

## 2024-04-29 RX ADMIN — SODIUM CHLORIDE 1000 ML: 9 INJECTION, SOLUTION INTRAVENOUS at 11:04

## 2024-04-29 RX ADMIN — ONDANSETRON 4 MG: 2 INJECTION INTRAMUSCULAR; INTRAVENOUS at 11:04

## 2024-04-30 ENCOUNTER — ANESTHESIA EVENT (OUTPATIENT)
Dept: SURGERY | Facility: HOSPITAL | Age: 59
End: 2024-04-30

## 2024-04-30 ENCOUNTER — HOSPITAL ENCOUNTER (OUTPATIENT)
Facility: HOSPITAL | Age: 59
Discharge: HOME | End: 2024-04-30
Admitting: SURGERY

## 2024-04-30 ENCOUNTER — ANESTHESIA (OUTPATIENT)
Dept: SURGERY | Facility: HOSPITAL | Age: 59
End: 2024-04-30

## 2024-04-30 VITALS
TEMPERATURE: 98 F | RESPIRATION RATE: 18 BRPM | SYSTOLIC BLOOD PRESSURE: 190 MMHG | DIASTOLIC BLOOD PRESSURE: 109 MMHG | HEIGHT: 70 IN | BODY MASS INDEX: 29.63 KG/M2 | HEIGHT: 70 IN | DIASTOLIC BLOOD PRESSURE: 84 MMHG | RESPIRATION RATE: 15 BRPM | OXYGEN SATURATION: 99 % | HEART RATE: 72 BPM | BODY MASS INDEX: 29.61 KG/M2 | WEIGHT: 206.81 LBS | WEIGHT: 207 LBS | SYSTOLIC BLOOD PRESSURE: 169 MMHG | OXYGEN SATURATION: 97 % | TEMPERATURE: 97 F | HEART RATE: 84 BPM

## 2024-04-30 DIAGNOSIS — K81.9 CHOLECYSTITIS: Primary | ICD-10-CM

## 2024-04-30 DIAGNOSIS — K80.00 CALCULUS OF GALLBLADDER WITH ACUTE CHOLECYSTITIS WITHOUT OBSTRUCTION: ICD-10-CM

## 2024-04-30 LAB
ALBUMIN SERPL BCP-MCNC: 3.5 G/DL (ref 3.5–5)
ALBUMIN/GLOB SERPL: 0.8 {RATIO}
ALP SERPL-CCNC: 69 U/L (ref 45–115)
ALT SERPL W P-5'-P-CCNC: 69 U/L (ref 16–61)
AMPHET UR QL SCN: POSITIVE
ANION GAP SERPL CALCULATED.3IONS-SCNC: 16 MMOL/L (ref 7–16)
AST SERPL W P-5'-P-CCNC: 33 U/L (ref 15–37)
BACTERIA #/AREA URNS HPF: ABNORMAL /HPF
BARBITURATES UR QL SCN: NEGATIVE
BASOPHILS # BLD AUTO: 0.05 K/UL (ref 0–0.2)
BASOPHILS NFR BLD AUTO: 0.4 % (ref 0–1)
BENZODIAZ METAB UR QL SCN: NEGATIVE
BILIRUB SERPL-MCNC: 0.5 MG/DL (ref ?–1.2)
BILIRUB UR QL STRIP: NEGATIVE
BILIRUB UR QL STRIP: NEGATIVE
BUN SERPL-MCNC: 14 MG/DL (ref 7–18)
BUN/CREAT SERPL: 14 (ref 6–20)
CALCIUM SERPL-MCNC: 8.8 MG/DL (ref 8.5–10.1)
CANNABINOIDS UR QL SCN: NEGATIVE
CHLORIDE SERPL-SCNC: 102 MMOL/L (ref 98–107)
CLARITY UR: ABNORMAL
CLARITY UR: CLEAR
CO2 SERPL-SCNC: 24 MMOL/L (ref 21–32)
COCAINE UR QL SCN: NEGATIVE
COLOR UR: ABNORMAL
COLOR UR: YELLOW
CREAT SERPL-MCNC: 1.01 MG/DL (ref 0.7–1.3)
DIFFERENTIAL METHOD BLD: ABNORMAL
EGFR (NO RACE VARIABLE) (RUSH/TITUS): 87 ML/MIN/1.73M2
EOSINOPHIL # BLD AUTO: 0 K/UL (ref 0–0.5)
EOSINOPHIL NFR BLD AUTO: 0 % (ref 1–4)
ERYTHROCYTE [DISTWIDTH] IN BLOOD BY AUTOMATED COUNT: 13 % (ref 11.5–14.5)
GLOBULIN SER-MCNC: 4.5 G/DL (ref 2–4)
GLUCOSE SERPL-MCNC: 113 MG/DL (ref 70–105)
GLUCOSE SERPL-MCNC: 151 MG/DL (ref 70–105)
GLUCOSE SERPL-MCNC: 171 MG/DL (ref 74–106)
GLUCOSE UR STRIP-MCNC: 250 MG/DL
GLUCOSE UR STRIP-MCNC: 500 MG/DL
HCT VFR BLD AUTO: 48.7 % (ref 40–54)
HGB BLD-MCNC: 16.4 G/DL (ref 13.5–18)
IMM GRANULOCYTES # BLD AUTO: 0.07 K/UL (ref 0–0.04)
IMM GRANULOCYTES NFR BLD: 0.6 % (ref 0–0.4)
INR BLD: 1.05
KETONES UR STRIP-SCNC: NEGATIVE MG/DL
KETONES UR STRIP-SCNC: NEGATIVE MG/DL
LEUKOCYTE ESTERASE UR QL STRIP: NEGATIVE
LEUKOCYTE ESTERASE UR QL STRIP: NEGATIVE
LYMPHOCYTES # BLD AUTO: 1.79 K/UL (ref 1–4.8)
LYMPHOCYTES NFR BLD AUTO: 14.5 % (ref 27–41)
MCH RBC QN AUTO: 28.2 PG (ref 27–31)
MCHC RBC AUTO-ENTMCNC: 33.7 G/DL (ref 32–36)
MCV RBC AUTO: 83.7 FL (ref 80–96)
MONOCYTES # BLD AUTO: 0.75 K/UL (ref 0–0.8)
MONOCYTES NFR BLD AUTO: 6.1 % (ref 2–6)
MPC BLD CALC-MCNC: 9.9 FL (ref 9.4–12.4)
NEUTROPHILS # BLD AUTO: 9.65 K/UL (ref 1.8–7.7)
NEUTROPHILS NFR BLD AUTO: 78.4 % (ref 53–65)
NITRITE UR QL STRIP: NEGATIVE
NITRITE UR QL STRIP: NEGATIVE
NRBC # BLD AUTO: 0 X10E3/UL
NRBC, AUTO (.00): 0 %
OPIATES UR QL SCN: NEGATIVE
PCP UR QL SCN: NEGATIVE
PH UR STRIP: 7.5 PH UNITS
PH UR STRIP: 8 PH UNITS
PLATELET # BLD AUTO: 195 K/UL (ref 150–400)
POTASSIUM SERPL-SCNC: 4 MMOL/L (ref 3.5–5.1)
PROT SERPL-MCNC: 8 G/DL (ref 6.4–8.2)
PROT UR QL STRIP: 10
PROT UR QL STRIP: 30
PROTHROMBIN TIME: 13.6 SECONDS (ref 11.7–14.7)
RBC # BLD AUTO: 5.82 M/UL (ref 4.6–6.2)
RBC # UR STRIP: NEGATIVE /UL
RBC # UR STRIP: NEGATIVE /UL
SODIUM SERPL-SCNC: 138 MMOL/L (ref 136–145)
SP GR UR STRIP: 1.02
SP GR UR STRIP: 1.02
SQUAMOUS #/AREA URNS LPF: ABNORMAL /LPF
UROBILINOGEN UR STRIP-ACNC: 0.2 MG/DL
UROBILINOGEN UR STRIP-ACNC: NORMAL MG/DL
WBC # BLD AUTO: 12.31 K/UL (ref 4.5–11)
WBC #/AREA URNS HPF: ABNORMAL /HPF

## 2024-04-30 PROCEDURE — 71000015 HC POSTOP RECOV 1ST HR: Performed by: SURGERY

## 2024-04-30 PROCEDURE — 85025 COMPLETE CBC W/AUTO DIFF WBC: CPT | Performed by: NURSE PRACTITIONER

## 2024-04-30 PROCEDURE — 27000655: Performed by: NURSE ANESTHETIST, CERTIFIED REGISTERED

## 2024-04-30 PROCEDURE — 63600175 PHARM REV CODE 636 W HCPCS: Performed by: NURSE PRACTITIONER

## 2024-04-30 PROCEDURE — 25000003 PHARM REV CODE 250: Performed by: NURSE ANESTHETIST, CERTIFIED REGISTERED

## 2024-04-30 PROCEDURE — 27000689 HC BLADE LARYNGOSCOPE ANY SIZE: Performed by: NURSE ANESTHETIST, CERTIFIED REGISTERED

## 2024-04-30 PROCEDURE — 82962 GLUCOSE BLOOD TEST: CPT

## 2024-04-30 PROCEDURE — 27201423 OPTIME MED/SURG SUP & DEVICES STERILE SUPPLY: Performed by: SURGERY

## 2024-04-30 PROCEDURE — 85610 PROTHROMBIN TIME: CPT | Performed by: NURSE PRACTITIONER

## 2024-04-30 PROCEDURE — 25500020 PHARM REV CODE 255: Performed by: NURSE PRACTITIONER

## 2024-04-30 PROCEDURE — 63600175 PHARM REV CODE 636 W HCPCS: Performed by: NURSE ANESTHETIST, CERTIFIED REGISTERED

## 2024-04-30 PROCEDURE — 80307 DRUG TEST PRSMV CHEM ANLYZR: CPT | Performed by: NURSE PRACTITIONER

## 2024-04-30 PROCEDURE — 27000510 HC BLANKET BAIR HUGGER ANY SIZE: Performed by: NURSE ANESTHETIST, CERTIFIED REGISTERED

## 2024-04-30 PROCEDURE — 63600175 PHARM REV CODE 636 W HCPCS: Mod: JZ,JG | Performed by: SURGERY

## 2024-04-30 PROCEDURE — 80053 COMPREHEN METABOLIC PANEL: CPT | Performed by: NURSE PRACTITIONER

## 2024-04-30 PROCEDURE — 99285 EMERGENCY DEPT VISIT HI MDM: CPT | Mod: ,,, | Performed by: NURSE PRACTITIONER

## 2024-04-30 PROCEDURE — 27000716 HC OXISENSOR PROBE, ANY SIZE: Performed by: NURSE ANESTHETIST, CERTIFIED REGISTERED

## 2024-04-30 PROCEDURE — 96372 THER/PROPH/DIAG INJ SC/IM: CPT | Performed by: NURSE PRACTITIONER

## 2024-04-30 PROCEDURE — 37000008 HC ANESTHESIA 1ST 15 MINUTES: Performed by: SURGERY

## 2024-04-30 PROCEDURE — 81003 URINALYSIS AUTO W/O SCOPE: CPT | Performed by: NURSE PRACTITIONER

## 2024-04-30 PROCEDURE — 37000009 HC ANESTHESIA EA ADD 15 MINS: Performed by: SURGERY

## 2024-04-30 PROCEDURE — 25000003 PHARM REV CODE 250: Performed by: NURSE PRACTITIONER

## 2024-04-30 PROCEDURE — 71000033 HC RECOVERY, INTIAL HOUR: Performed by: SURGERY

## 2024-04-30 PROCEDURE — 96375 TX/PRO/DX INJ NEW DRUG ADDON: CPT

## 2024-04-30 PROCEDURE — D9220A PRA ANESTHESIA: Mod: ,,, | Performed by: ANESTHESIOLOGY

## 2024-04-30 PROCEDURE — 96361 HYDRATE IV INFUSION ADD-ON: CPT

## 2024-04-30 PROCEDURE — 47562 LAPAROSCOPIC CHOLECYSTECTOMY: CPT | Mod: 22,,, | Performed by: SURGERY

## 2024-04-30 PROCEDURE — 88304 TISSUE EXAM BY PATHOLOGIST: CPT | Mod: 26,,, | Performed by: PATHOLOGY

## 2024-04-30 PROCEDURE — 96365 THER/PROPH/DIAG IV INF INIT: CPT

## 2024-04-30 PROCEDURE — 99285 EMERGENCY DEPT VISIT HI MDM: CPT | Mod: 25

## 2024-04-30 PROCEDURE — 36000709 HC OR TIME LEV III EA ADD 15 MIN: Performed by: SURGERY

## 2024-04-30 PROCEDURE — 88304 TISSUE EXAM BY PATHOLOGIST: CPT | Mod: TC,SUR | Performed by: SURGERY

## 2024-04-30 PROCEDURE — 36000708 HC OR TIME LEV III 1ST 15 MIN: Performed by: SURGERY

## 2024-04-30 PROCEDURE — 99223 1ST HOSP IP/OBS HIGH 75: CPT | Mod: 57,,, | Performed by: SURGERY

## 2024-04-30 PROCEDURE — 27000165 HC TUBE, ETT CUFFED: Performed by: NURSE ANESTHETIST, CERTIFIED REGISTERED

## 2024-04-30 RX ORDER — ROCURONIUM BROMIDE 10 MG/ML
INJECTION, SOLUTION INTRAVENOUS
Status: DISCONTINUED | OUTPATIENT
Start: 2024-04-30 | End: 2024-04-30

## 2024-04-30 RX ORDER — DEXAMETHASONE SODIUM PHOSPHATE 4 MG/ML
INJECTION, SOLUTION INTRA-ARTICULAR; INTRALESIONAL; INTRAMUSCULAR; INTRAVENOUS; SOFT TISSUE
Status: DISCONTINUED | OUTPATIENT
Start: 2024-04-30 | End: 2024-04-30

## 2024-04-30 RX ORDER — BUPIVACAINE HYDROCHLORIDE 2.5 MG/ML
INJECTION, SOLUTION EPIDURAL; INFILTRATION; INTRACAUDAL
Status: DISCONTINUED | OUTPATIENT
Start: 2024-04-30 | End: 2024-04-30 | Stop reason: HOSPADM

## 2024-04-30 RX ORDER — MORPHINE SULFATE 10 MG/ML
4 INJECTION INTRAMUSCULAR; INTRAVENOUS; SUBCUTANEOUS EVERY 5 MIN PRN
Status: DISCONTINUED | OUTPATIENT
Start: 2024-04-30 | End: 2024-04-30 | Stop reason: HOSPADM

## 2024-04-30 RX ORDER — MORPHINE SULFATE 4 MG/ML
4 INJECTION, SOLUTION INTRAMUSCULAR; INTRAVENOUS
Status: COMPLETED | OUTPATIENT
Start: 2024-04-30 | End: 2024-04-30

## 2024-04-30 RX ORDER — KETOROLAC TROMETHAMINE 30 MG/ML
15 INJECTION, SOLUTION INTRAMUSCULAR; INTRAVENOUS
Status: COMPLETED | OUTPATIENT
Start: 2024-04-30 | End: 2024-04-30

## 2024-04-30 RX ORDER — MIDAZOLAM HYDROCHLORIDE 1 MG/ML
INJECTION INTRAMUSCULAR; INTRAVENOUS
Status: DISCONTINUED | OUTPATIENT
Start: 2024-04-30 | End: 2024-04-30

## 2024-04-30 RX ORDER — LIDOCAINE HYDROCHLORIDE 20 MG/ML
INJECTION INTRAVENOUS
Status: DISCONTINUED | OUTPATIENT
Start: 2024-04-30 | End: 2024-04-30

## 2024-04-30 RX ORDER — MEPERIDINE HYDROCHLORIDE 25 MG/ML
25 INJECTION INTRAMUSCULAR; INTRAVENOUS; SUBCUTANEOUS EVERY 10 MIN PRN
Status: DISCONTINUED | OUTPATIENT
Start: 2024-04-30 | End: 2024-04-30 | Stop reason: HOSPADM

## 2024-04-30 RX ORDER — HYDROCODONE BITARTRATE AND ACETAMINOPHEN 7.5; 325 MG/1; MG/1
1 TABLET ORAL EVERY 6 HOURS PRN
Qty: 15 TABLET | Refills: 0 | Status: SHIPPED | OUTPATIENT
Start: 2024-04-30

## 2024-04-30 RX ORDER — HYDROMORPHONE HYDROCHLORIDE 2 MG/ML
1 INJECTION, SOLUTION INTRAMUSCULAR; INTRAVENOUS; SUBCUTANEOUS
Status: COMPLETED | OUTPATIENT
Start: 2024-04-30 | End: 2024-04-30

## 2024-04-30 RX ORDER — DICYCLOMINE HYDROCHLORIDE 10 MG/ML
20 INJECTION INTRAMUSCULAR
Status: COMPLETED | OUTPATIENT
Start: 2024-04-30 | End: 2024-04-30

## 2024-04-30 RX ORDER — ONDANSETRON HYDROCHLORIDE 2 MG/ML
4 INJECTION, SOLUTION INTRAVENOUS DAILY PRN
Status: DISCONTINUED | OUTPATIENT
Start: 2024-04-30 | End: 2024-04-30 | Stop reason: HOSPADM

## 2024-04-30 RX ORDER — HYDROMORPHONE HYDROCHLORIDE 2 MG/ML
0.5 INJECTION, SOLUTION INTRAMUSCULAR; INTRAVENOUS; SUBCUTANEOUS EVERY 5 MIN PRN
Status: DISCONTINUED | OUTPATIENT
Start: 2024-04-30 | End: 2024-04-30 | Stop reason: HOSPADM

## 2024-04-30 RX ORDER — IPRATROPIUM BROMIDE AND ALBUTEROL SULFATE 2.5; .5 MG/3ML; MG/3ML
3 SOLUTION RESPIRATORY (INHALATION) ONCE
Status: DISCONTINUED | OUTPATIENT
Start: 2024-04-30 | End: 2024-04-30 | Stop reason: HOSPADM

## 2024-04-30 RX ORDER — ONDANSETRON HYDROCHLORIDE 2 MG/ML
4 INJECTION, SOLUTION INTRAVENOUS
Status: COMPLETED | OUTPATIENT
Start: 2024-04-30 | End: 2024-04-30

## 2024-04-30 RX ORDER — FENTANYL CITRATE 50 UG/ML
INJECTION, SOLUTION INTRAMUSCULAR; INTRAVENOUS
Status: DISCONTINUED | OUTPATIENT
Start: 2024-04-30 | End: 2024-04-30

## 2024-04-30 RX ORDER — CLINDAMYCIN PHOSPHATE 900 MG/50ML
INJECTION, SOLUTION INTRAVENOUS
Status: DISCONTINUED | OUTPATIENT
Start: 2024-04-30 | End: 2024-04-30

## 2024-04-30 RX ORDER — PROPOFOL 10 MG/ML
VIAL (ML) INTRAVENOUS
Status: DISCONTINUED | OUTPATIENT
Start: 2024-04-30 | End: 2024-04-30

## 2024-04-30 RX ORDER — PHENYLEPHRINE HYDROCHLORIDE 10 MG/ML
INJECTION INTRAVENOUS
Status: DISCONTINUED | OUTPATIENT
Start: 2024-04-30 | End: 2024-04-30

## 2024-04-30 RX ORDER — ONDANSETRON HYDROCHLORIDE 2 MG/ML
INJECTION, SOLUTION INTRAVENOUS
Status: DISCONTINUED | OUTPATIENT
Start: 2024-04-30 | End: 2024-04-30

## 2024-04-30 RX ORDER — DIPHENHYDRAMINE HYDROCHLORIDE 50 MG/ML
25 INJECTION INTRAMUSCULAR; INTRAVENOUS EVERY 6 HOURS PRN
Status: DISCONTINUED | OUTPATIENT
Start: 2024-04-30 | End: 2024-04-30 | Stop reason: HOSPADM

## 2024-04-30 RX ADMIN — FENTANYL CITRATE 100 MCG: 50 INJECTION INTRAMUSCULAR; INTRAVENOUS at 12:04

## 2024-04-30 RX ADMIN — MIDAZOLAM HYDROCHLORIDE 2 MG: 1 INJECTION, SOLUTION INTRAMUSCULAR; INTRAVENOUS at 12:04

## 2024-04-30 RX ADMIN — ONDANSETRON 4 MG: 2 INJECTION INTRAMUSCULAR; INTRAVENOUS at 12:04

## 2024-04-30 RX ADMIN — DICYCLOMINE HYDROCHLORIDE 20 MG: 10 INJECTION, SOLUTION INTRAMUSCULAR at 12:04

## 2024-04-30 RX ADMIN — PROPOFOL 180 MG: 10 INJECTION, EMULSION INTRAVENOUS at 12:04

## 2024-04-30 RX ADMIN — SODIUM CHLORIDE 1000 ML: 9 INJECTION, SOLUTION INTRAVENOUS at 08:04

## 2024-04-30 RX ADMIN — MORPHINE SULFATE 4 MG: 4 INJECTION, SOLUTION INTRAMUSCULAR; INTRAVENOUS at 09:04

## 2024-04-30 RX ADMIN — SODIUM CHLORIDE: 9 INJECTION, SOLUTION INTRAVENOUS at 12:04

## 2024-04-30 RX ADMIN — CLINDAMYCIN IN 5 PERCENT DEXTROSE 900 MG: 18 INJECTION, SOLUTION INTRAVENOUS at 12:04

## 2024-04-30 RX ADMIN — SUGAMMADEX 200 MG: 100 INJECTION, SOLUTION INTRAVENOUS at 01:04

## 2024-04-30 RX ADMIN — LIDOCAINE HYDROCHLORIDE 100 MG: 20 INJECTION, SOLUTION INTRAVENOUS at 12:04

## 2024-04-30 RX ADMIN — MEROPENEM 1 G: 1 INJECTION, POWDER, FOR SOLUTION INTRAVENOUS at 10:04

## 2024-04-30 RX ADMIN — HYDROMORPHONE HYDROCHLORIDE 1 MG: 2 INJECTION, SOLUTION INTRAMUSCULAR; INTRAVENOUS; SUBCUTANEOUS at 09:04

## 2024-04-30 RX ADMIN — IOPAMIDOL 100 ML: 755 INJECTION, SOLUTION INTRAVENOUS at 12:04

## 2024-04-30 RX ADMIN — ROCURONIUM BROMIDE 50 MG: 10 INJECTION, SOLUTION INTRAVENOUS at 12:04

## 2024-04-30 RX ADMIN — PHENYLEPHRINE HYDROCHLORIDE 100 MCG: 10 INJECTION INTRAVENOUS at 12:04

## 2024-04-30 RX ADMIN — DEXAMETHASONE SODIUM PHOSPHATE 4 MG: 4 INJECTION, SOLUTION INTRA-ARTICULAR; INTRALESIONAL; INTRAMUSCULAR; INTRAVENOUS; SOFT TISSUE at 12:04

## 2024-04-30 RX ADMIN — KETOROLAC TROMETHAMINE 15 MG: 30 INJECTION, SOLUTION INTRAMUSCULAR at 12:04

## 2024-04-30 RX ADMIN — ONDANSETRON 4 MG: 2 INJECTION INTRAMUSCULAR; INTRAVENOUS at 08:04

## 2024-04-30 NOTE — H&P
[facility] - Emergency Department  General Surgery  History & Physical    Patient Name: Fredy Garcia  MRN: [id number]  Admission Date: 4/30/2024  Attending Physician: No att. providers found   Primary Care Provider: Edgardo Dos Santos FNP-C    Patient information was obtained from patient and ER records.     Subjective:     Chief Complaint/Reason for Admission:  Abdominal pain    History of Present Illness:  Patient is a 59 y.o. male presents with a few month history of intermittent epigastric right upper quadrant abdominal pain associated with minimal nausea.  He had a pretty bad episode last night came to the ER CT scan was negative.  Ultrasound is morning showed distended gallbladder with positive Sheikh sign.  He has had 2 similar episodes since getting this year.  No other major abdominal operations before.    Current Facility-Administered Medications   Medication Dose Route Frequency Provider Last Rate Last Admin    meropenem (MERREM) 1 g in sodium chloride 0.9 % 100 mL IVPB (MB+)  1 g Intravenous ED 1 Time Ghada Nassar  mL/hr at 04/30/24 1019 1 g at 04/30/24 1019     Current Outpatient Medications   Medication Sig Dispense Refill    albuterol (PROVENTIL/VENTOLIN HFA) 90 mcg/actuation inhaler Inhale 1-2 puffs into the lungs every 4 (four) hours as needed for Wheezing (or persistent cough). Rescue 18 g 0    lisinopriL (PRINIVIL,ZESTRIL) 20 MG tablet Take 20 mg by mouth once daily.      metFORMIN (GLUCOPHAGE) 500 MG tablet Take 500 mg by mouth 2 (two) times daily with meals.      ondansetron (ZOFRAN-ODT) 4 MG TbDL Take 1 tablet (4 mg total) by mouth every 8 (eight) hours as needed. 10 tablet 0       Review of patient's allergies indicates:   Allergen Reactions    Pcn [penicillins]        Past Medical History:   Diagnosis Date    Diabetes mellitus     Hypertension      No past surgical history on file.  Family History    None       Tobacco Use    Smoking status: Every Day     Current packs/day:  1.00     Types: Cigarettes    Smokeless tobacco: Never   Substance and Sexual Activity    Alcohol use: Never    Drug use: Not Currently    Sexual activity: Not on file     Review of Systems   Constitutional:  Negative for activity change, appetite change, fatigue and fever.   HENT:  Negative for trouble swallowing.    Respiratory:  Negative for cough and shortness of breath.    Cardiovascular:  Negative for chest pain and palpitations.   Gastrointestinal:  Positive for abdominal pain. Negative for abdominal distention, blood in stool, constipation and diarrhea.   Genitourinary:  Negative for flank pain.   Musculoskeletal:  Negative for neck pain and neck stiffness.   Neurological:  Negative for weakness.     Objective:     Vital Signs (Most Recent):  Temp: 97.7 °F (36.5 °C) (04/30/24 0830)  Pulse: 78 (04/30/24 0830)  Resp: 18 (04/30/24 0930)  BP: (!) 223/121 (patient constantly moving and rocking back and forth and will not hold arm still) (04/30/24 0830)  SpO2: 100 % (04/30/24 0830) Vital Signs (24h Range):  Temp:  [97.3 °F (36.3 °C)-97.7 °F (36.5 °C)] 97.7 °F (36.5 °C)  Pulse:  [72-87] 78  Resp:  [18-22] 18  SpO2:  [99 %-100 %] 100 %  BP: (190-223)/(109-121) 223/121     Weight: 93.9 kg (207 lb)  Body mass index is 29.7 kg/m².    Physical Exam  Constitutional:       General: He is not in acute distress.  HENT:      Head: Normocephalic.   Cardiovascular:      Rate and Rhythm: Normal rate and regular rhythm.      Pulses: Normal pulses.   Pulmonary:      Effort: Pulmonary effort is normal. No respiratory distress.      Breath sounds: Normal breath sounds.   Abdominal:      General: Abdomen is flat. There is no distension.      Palpations: Abdomen is soft.      Tenderness: There is abdominal tenderness.   Musculoskeletal:         General: Normal range of motion.   Skin:     General: Skin is warm.   Neurological:      General: No focal deficit present.      Mental Status: He is oriented to person, place, and time.          Significant Labs:  I have reviewed all pertinent lab results within the past 24 hours.  CBC:   Recent Labs   Lab 04/30/24  0856   WBC 12.31*   RBC 5.82   HGB 16.4   HCT 48.7      MCV 83.7   MCH 28.2   MCHC 33.7     BMP:   Recent Labs   Lab 04/30/24  0856   *      K 4.0      CO2 24   BUN 14   CREATININE 1.01   CALCIUM 8.8     CMP:   Recent Labs   Lab 04/30/24  0856   *   CALCIUM 8.8   ALBUMIN 3.5   PROT 8.0      K 4.0   CO2 24      BUN 14   CREATININE 1.01   ALKPHOS 69   ALT 69*   AST 33   BILITOT 0.5       Significant Diagnostics:  I have reviewed all pertinent imaging results/findings within the past 24 hours.  U/S: I have reviewed all pertinent results/findings within the past 24 hours and my personal findings are:  acute cholecystitis    Assessment/Plan:     Active Diagnoses:    Diagnosis Date Noted POA    PRINCIPAL PROBLEM:  Calculus of gallbladder with acute cholecystitis without obstruction [K80.00] 04/30/2024 Yes      Problems Resolved During this Admission:     VTE Risk Mitigation (From admission, onward)      None          Patient to go to the OR for laparoscopic cholecystectomy.  Risks and benefits explained to the patient clear risk of bleeding, infection, open operation, injury to surrounding organs, possible need for additional operations or procedures.  All questions were answered.    John Watson MD  General Surgery  [facility] - Emergency Department

## 2024-04-30 NOTE — ED PROVIDER NOTES
Encounter Date: 4/29/2024       History     Chief Complaint   Patient presents with    Abdominal Pain     Patient presents to the ED with complaints of abdominal pain, denies any N/V/D or fever, states this is the 3rd episode this year and he has been told it's his gallbladder. Patient states he was supposed to have a gallbladder ultrasound last week but did not make the appointment.     The history is provided by the patient.     Review of patient's allergies indicates:   Allergen Reactions    Pcn [penicillins]      Past Medical History:   Diagnosis Date    Diabetes mellitus     Hypertension      No past surgical history on file.  No family history on file.  Social History     Tobacco Use    Smoking status: Every Day     Current packs/day: 1.00     Types: Cigarettes    Smokeless tobacco: Never   Substance Use Topics    Alcohol use: Never    Drug use: Not Currently     Review of Systems   Constitutional: Negative.    Respiratory: Negative.     Cardiovascular: Negative.    Gastrointestinal:  Positive for abdominal pain. Negative for constipation, diarrhea, nausea and vomiting.   Musculoskeletal: Negative.    Skin: Negative.    Neurological: Negative.    Psychiatric/Behavioral: Negative.     All other systems reviewed and are negative.      Physical Exam     Initial Vitals [04/29/24 2324]   BP Pulse Resp Temp SpO2   (!) 220/120 87 (!) 22 97.3 °F (36.3 °C) 100 %      MAP       --         Physical Exam    Vitals reviewed.  Constitutional: He appears well-developed and well-nourished.   Cardiovascular:  Normal rate, regular rhythm, normal heart sounds and intact distal pulses.           Pulmonary/Chest: Breath sounds normal.   Abdominal: Abdomen is soft. Bowel sounds are normal. There is abdominal tenderness (generalized).   Musculoskeletal:         General: Normal range of motion.     Neurological: He is alert and oriented to person, place, and time. He has normal strength. GCS score is 15. GCS eye subscore is 4. GCS  verbal subscore is 5. GCS motor subscore is 6.   Skin: Skin is warm and dry. Capillary refill takes less than 2 seconds.   Psychiatric: He has a normal mood and affect. His behavior is normal. Judgment and thought content normal.         Medical Screening Exam   See Full Note    ED Course   Procedures  Labs Reviewed   COMPREHENSIVE METABOLIC PANEL - Abnormal; Notable for the following components:       Result Value    Glucose 133 (*)     Total Protein 8.6 (*)     Globulin 4.8 (*)     ALT 66 (*)     All other components within normal limits   URINALYSIS, REFLEX TO URINE CULTURE - Abnormal; Notable for the following components:    Protein, UA 30 (*)     Glucose,  (*)     All other components within normal limits   CBC WITH DIFFERENTIAL - Abnormal; Notable for the following components:    Neutrophils % 51.4 (*)     Monocytes % 8.7 (*)     Monocytes, Absolute 0.95 (*)     All other components within normal limits   DRUG SCREEN, URINE (BEAKER) - Abnormal; Notable for the following components:    Amphetamine, Urine Positive (*)     All other components within normal limits   URINALYSIS, MICROSCOPIC - Abnormal; Notable for the following components:    Squamous Epithelial Cells, UA Few (*)     All other components within normal limits   LIPASE - Normal   CBC W/ AUTO DIFFERENTIAL    Narrative:     The following orders were created for panel order CBC W/ AUTO DIFFERENTIAL.  Procedure                               Abnormality         Status                     ---------                               -----------         ------                     CBC with Differential[[id number]]       Abnormal            Final result                 Please view results for these tests on the individual orders.          Imaging Results              CT Abdomen Pelvis With IV Contrast NO Oral Contrast (In process)                      Medications   sodium chloride 0.9% bolus 1,000 mL 1,000 mL (0 mLs Intravenous Stopped 4/30/24 0039)    ondansetron injection 4 mg (4 mg Intravenous Given 4/29/24 7126)   ketorolac injection 15 mg (15 mg Intravenous Given 4/30/24 0011)   dicyclomine injection 20 mg (20 mg Intramuscular Given 4/30/24 0021)   iopamidoL (ISOVUE-370) injection 100 mL (100 mLs Intravenous Given 4/30/24 0045)     Medical Decision Making  MDM    Patient presents for emergent evaluation of acute abdominal pain that poses a threat to life and/or bodily function.    In the ED patient found to have acute gallstones.    I ordered labs and personally reviewed them.  Labs significant for WBC 10.87, H/H 17.1/49.8, Platelets 223, Na 138, K 4.0, Cl 100, BUN 11, Creat 1.09, ALP 73, AST 31, ALT 66, Lipase 49  I ordered CT scan and personally reviewed it and reviewed the radiologist interpretation.  CT abdomen significant for Gallstones, Stones are seen in the region of the gallbladder neck. There is no significant gallbladder wall thickening or surrounding edema or fluid. No evidence of bowel obstruction, appendicitis or diverticulitis. No urinary tract calculus or obstruction. No abnormal fluid or regional inflammatory reaction.       Discharge MDM  I discussed the patient presentation labs, CT findings with the consultant for [facility] tele-emergency, Dr. Saleh, she agreed with treatment and discharge plan with the patient. Will refer patient to general surgery.    Patient was managed in the ED with IV fluids, Zofran and Toradol.    The response to treatment was good.    Patient was discharged in stable condition.  Detailed return precautions discussed.    Amount and/or Complexity of Data Reviewed  Labs: ordered.  Radiology: ordered.    Risk  Prescription drug management.                                      Clinical Impression:   Final diagnoses:  [K80.20] Gallstones (Primary)        ED Disposition Condition    Discharge Stable          ED Prescriptions    None       Follow-up Information    None          Jennifer Biswas, SAVANNAH  04/30/24 0159

## 2024-04-30 NOTE — ED NOTES
Faxed facesheet and consent to [facility] for telemed consult.. Patient ambulatory to restroom. States feels a lot better.

## 2024-04-30 NOTE — ED TRIAGE NOTES
Presents to ER with c/o right upper quad pain. States he is having a gallbladder attack. Has been seen for this previous. Was supposed to go see a Dr concerning this but had to cancel the appointment. States he ate steak tonight. Will not be still during triage.

## 2024-04-30 NOTE — OP NOTE
[facility] - Periop Services  Surgery Department  Operative Note    SUMMARY     Date of Procedure: 4/30/2024     Procedure: Procedure(s) (LRB):  CHOLECYSTECTOMY, LAPAROSCOPIC (N/A)     Surgeons and Role:     * John Watson MD - Primary    Assisting Surgeon: None    Pre-Operative Diagnosis: Cholecystitis [K81.9]    Post-Operative Diagnosis: Post-Op Diagnosis Codes:     * Cholecystitis [K81.9]    Anesthesia: General    Procedures Performed: laparoscopic cholecystectomy    Significant Findings of the Procedure: acute cholecystitis with moderate to severe adhesions around the cystic structures.  Cirrhotic liver appreciated.  Hydrops of the gallbladder with clear fluid within it.    Procedure in Detail: After informed consent was obtained patient was brought to the OR and prepped and draped in the usual sterile fashion. We started off by optically inserting a 5 mm trocar at the umbilical area. Pneumoperitoneum was obtained up to 15 cm of mercury of pressure. We then under direct visualization placed an additional 11 mm port in the subxiphoid area and 2 additional 5 mm ports in the right upper quadrant.  Gallbladder was very distended with dense omental adhesions to it.  We would started by making a hole of the dome suctioned out clear bile consistent with hydrops of the gallbladder.  We had to take down the omental adhesions and the dense adhesions around the cystic structures.  We could not visualize it very well we had to place an additional 5 mm trocar at the left paramedian area and allowing a fan retractor to retract the bowel inferiorly.  We then retracted the gallbladder over the liver and began dissecting out the cystic structures. The cystic duct and the artery were the only 2 structures entering the gallbladder and these were clipped twice distally once proximally and cut between. We then used the hook cautery to remove the gallbladder from the gallbladder fossa. . We then used an Endo Catch bag  to remove the gallbladder. We then inspected our bed and clips and they were all intact and hemostatic. We watched our ports come out.  We then discontinued pneumoperitoneum. The skin was closed with a 4-0 Monocryl in a subcuticular manner and patient tolerated the procedure well.    I am adding a 22 modifier to this case due to the extra time required than a normal gallbladder.  We had to use an extra trocar to allow for retraction.  Dissection around the cystic structures took longer than normal.    Complications: No    Estimated Blood Loss (EBL): 75 mL           Implants: * No implants in log *    Specimens:   Specimen (24h ago, onward)       Start     Ordered    04/30/24 1258  Surgical Pathology  RELEASE UPON ORDERING         04/30/24 1258                            Condition: Good    Disposition: PACU - hemodynamically stable.    Attestation: I was present and scrubbed for the entire procedure.

## 2024-04-30 NOTE — ANESTHESIA PREPROCEDURE EVALUATION
04/30/2024  Fredy Garcia is a 59 y.o., male.      Pre-op Assessment    I have reviewed the Patient Summary Reports.     I have reviewed the Nursing Notes. I have reviewed the NPO Status.   I have reviewed the Medications.     Review of Systems  Anesthesia Hx:             Denies Family Hx of Anesthesia complications.    Denies Personal Hx of Anesthesia complications.                    Social:  Non-Smoker, No Alcohol Use, Smoker       Cardiovascular:     Hypertension                                  Hypertension         Pulmonary:    Asthma                    Musculoskeletal:  Musculoskeletal Normal                Endocrine:  Diabetes    Diabetes                          Physical Exam  General: Well nourished, Cooperative, Alert and Oriented    Airway:  Mallampati: II / II  Mouth Opening: Normal  TM Distance: Normal  Neck ROM: Normal ROM    Dental:  Intact    Chest/Lungs:  Clear to auscultation    Heart:  Rate: Normal  Rhythm: Regular Rhythm  Sounds: Normal        Chemistry        Component Value Date/Time     04/30/2024 0856    K 4.0 04/30/2024 0856     04/30/2024 0856    CO2 24 04/30/2024 0856    BUN 14 04/30/2024 0856    CREATININE 1.01 04/30/2024 0856     (H) 04/30/2024 0856        Component Value Date/Time    CALCIUM 8.8 04/30/2024 0856    ALKPHOS 69 04/30/2024 0856    AST 33 04/30/2024 0856    ALT 69 (H) 04/30/2024 0856    BILITOT 0.5 04/30/2024 0856    EGFRNONAA 92 08/02/2022 0940        Lab Results   Component Value Date    WBC 12.31 (H) 04/30/2024    HGB 16.4 04/30/2024    HCT 48.7 04/30/2024     04/30/2024     Results for orders placed or performed during the hospital encounter of 12/25/23   EKG 12-lead    Collection Time: 12/25/23  4:56 AM    Narrative    Test Reason : I10,    Vent. Rate : 059 BPM     Atrial Rate : 059 BPM     P-R Int : 170 ms          QRS Dur : 086 ms       QT Int : 430 ms       P-R-T Axes : 021 065 096 degrees     QTc Int : 425 ms    Sinus bradycardia  Nonspecific T wave abnormality  Abnormal ECG  No previous ECGs available  Confirmed by Tray Gutierrez MD ([id number]) on 1/2/2024 7:23:20 PM    Referred By:             Confirmed By:Tray Gutierrez MD         Anesthesia Plan  Type of Anesthesia, risks & benefits discussed:    Anesthesia Type: Gen ETT  Intra-op Monitoring Plan: Standard ASA Monitors  Post Op Pain Control Plan: multimodal analgesia  Induction:  IV  Airway Plan: Direct  Informed Consent: Informed consent signed with the Patient and all parties understand the risks and agree with anesthesia plan.  All questions answered.   ASA Score: 3  Day of Surgery Review of History & Physical: H&P Update referred to the surgeon/provider.I have interviewed and examined the patient. I have reviewed the patient's H&P dated: There are no significant changes.     Ready For Surgery From Anesthesia Perspective.     .

## 2024-04-30 NOTE — DISCHARGE SUMMARY
[facility] - Periop Services  Discharge Note  Short Stay    Procedure(s) (LRB):  CHOLECYSTECTOMY, LAPAROSCOPIC (N/A)      OUTCOME: Patient tolerated treatment/procedure well without complication and is now ready for discharge.    DISPOSITION: Home or Self Care    FINAL DIAGNOSIS:  Calculus of gallbladder with acute cholecystitis without obstruction    FOLLOWUP: In clinic    DISCHARGE INSTRUCTIONS:    Discharge Procedure Orders   Diet Adult Regular     Remove dressing in 24 hours     Notify your health care provider if you experience any of the following:  temperature >100.4     Notify your health care provider if you experience any of the following:  persistent nausea and vomiting or diarrhea     Notify your health care provider if you experience any of the following:  severe uncontrolled pain     Notify your health care provider if you experience any of the following:  redness, tenderness, or signs of infection (pain, swelling, redness, odor or green/yellow discharge around incision site)     Notify your health care provider if you experience any of the following:  difficulty breathing or increased cough     Notify your health care provider if you experience any of the following:  severe persistent headache     Notify your health care provider if you experience any of the following:  worsening rash     Notify your health care provider if you experience any of the following:  persistent dizziness, light-headedness, or visual disturbances     Notify your health care provider if you experience any of the following:  increased confusion or weakness     Notify your health care provider if you experience any of the following:     Shower on day dressing removed (No bath)        TIME SPENT ON DISCHARGE: 5 minutes

## 2024-04-30 NOTE — TRANSFER OF CARE
"Anesthesia Transfer of Care Note    Patient: Fredy Garcia    Procedure(s) Performed: Procedure(s) (LRB):  CHOLECYSTECTOMY, LAPAROSCOPIC (N/A)    Patient location: PACU    Anesthesia Type: general    Transport from OR: Transported from OR on 6-10 L/min O2 by face mask with adequate spontaneous ventilation    Post pain: adequate analgesia    Post assessment: no apparent anesthetic complications    Post vital signs: stable    Level of consciousness: awake and alert    Nausea/Vomiting: no nausea/vomiting    Complications: none    Transfer of care protocol was followed      Last vitals: Visit Vitals  BP (!) 163/91   Pulse 90   Temp 36.8 °C (98.2 °F)   Resp 18   Ht 5' 10" (1.778 m)   Wt 93.9 kg (207 lb)   SpO2 100%   BMI 29.70 kg/m²     "

## 2024-04-30 NOTE — OR NURSING
1326 Rec'd pt to PACU asleep with no signs of distress noted, respirations even and unlabored. VSS. Abd lap sites x5 intact with scant amount of bloody drainage noted. No needs. Will continue to monitor.    1342 Called and spoke with pt family. Update given and all questions answered.    1357 Out of PACU. VSS. No signs of bleeding/distress noted.     1400 Pt to ASC 19 drowsy but arousable to verbal stimuli. No signs of distress noted, respirations even and unlabored. No visitors at bedside. Bedside report given to AMAIRANI Briggs RN.  Scant amount of bloody drainage noted to abd lap sites x5. Denies pain/needs. /84, P 76, R 16, O2 95% RA.

## 2024-04-30 NOTE — ED PROVIDER NOTES
Encounter Date: 4/30/2024       History     Chief Complaint   Patient presents with    Abdominal Pain     58 y/o WM presents to the emergency department with c/o right upper quadrant abdominal pain. He reports he has been having these issues intermittently since January. He states he was seen in the emergency department in [place] at [facility] last night and had work up, including CT scan and was informed he had gallstones but no evidence of infection. He states he went home and has been uncomfortable all night to where he can no longer tolerate it. He states that he ate a steak last night and shortly after the pain started. He reports associated nausea but denies vomiting. He denies having had any fever or chills. He has had no diarrhea or constipation; last BM on yesterday and was normal for him. He appears uncomfortable on exam. He has had nothing for his pain since being discharged home last night. He was treated with Toradol, IVF and antiemetics with a referral placed to general surgery. He has had nothing to eat or drink. since around 0100 AM.     The history is provided by the patient and medical records.     Review of patient's allergies indicates:   Allergen Reactions    Pcn [penicillins]      Past Medical History:   Diagnosis Date    Diabetes mellitus     Hypertension      No past surgical history on file.  No family history on file.  Social History     Tobacco Use    Smoking status: Every Day     Current packs/day: 1.00     Types: Cigarettes    Smokeless tobacco: Never   Substance Use Topics    Alcohol use: Never    Drug use: Not Currently     Review of Systems   Constitutional:  Negative for chills and fever.   Gastrointestinal:  Positive for abdominal pain and nausea. Negative for constipation, diarrhea and vomiting.   Genitourinary:  Negative for dysuria and hematuria.   All other systems reviewed and are negative.      Physical Exam     Initial Vitals [04/30/24 0830]   BP Pulse Resp Temp SpO2    (!) 223/121 78 18 97.7 °F (36.5 °C) 100 %      MAP       --         Physical Exam    Constitutional: He appears well-developed and well-nourished. He is cooperative.  Non-toxic appearance.   Cardiovascular:  Normal rate, regular rhythm and normal heart sounds.           Pulmonary/Chest: Effort normal and breath sounds normal.   Abdominal: Abdomen is soft. Bowel sounds are normal. There is abdominal tenderness in the right upper quadrant. There is guarding and positive Sheikh's sign.     Neurological: He is alert and oriented to person, place, and time.   Skin: Skin is warm, dry and intact. Capillary refill takes less than 2 seconds.         Medical Screening Exam   See Full Note    ED Course   Procedures  Labs Reviewed   COMPREHENSIVE METABOLIC PANEL - Abnormal; Notable for the following components:       Result Value    Glucose 171 (*)     Globulin 4.5 (*)     ALT 69 (*)     All other components within normal limits   URINALYSIS, REFLEX TO URINE CULTURE - Abnormal; Notable for the following components:    Protein, UA 10 (*)     Glucose,  (*)     All other components within normal limits   CBC WITH DIFFERENTIAL - Abnormal; Notable for the following components:    WBC 12.31 (*)     Neutrophils % 78.4 (*)     Lymphocytes % 14.5 (*)     Monocytes % 6.1 (*)     Eosinophils % 0.0 (*)     Immature Granulocytes % 0.6 (*)     Neutrophils, Abs 9.65 (*)     Immature Granulocytes, Absolute 0.07 (*)     All other components within normal limits   PROTIME-INR - Normal   CBC W/ AUTO DIFFERENTIAL    Narrative:     The following orders were created for panel order CBC auto differential.  Procedure                               Abnormality         Status                     ---------                               -----------         ------                     CBC with Differential[[id number]]       Abnormal            Final result                 Please view results for these tests on the individual orders.          Imaging  Results              US Abdomen Limited (Final result)  Result time 04/30/24 09:32:04      Final result by Norberto Max II, MD (04/30/24 09:32:04)                   Impression:      Cholelithiasis with distension of the gallbladder and slight wall thickening could indicate cholecystitis.  No other evidence of abnormality demonstrated.    Ultrasound images stored and captured.      Electronically signed by: Norberto Max  Date:    04/30/2024  Time:    09:32               Narrative:    EXAMINATION:  US ABDOMEN LIMITED    CLINICAL HISTORY:  RUQ abd pain, nausea, gallstones;    TECHNIQUE:  Grayscale and color Doppler imaging performed.    COMPARISON:  None.    FINDINGS:  The liver is normal in size and echogenicity. The gallbladder is distended with multiple calculi.  The gallbladder wall thickness is 3.4 mm . The common bile duct measures 6.5 mm.    The visualized portion of the pancreas appear within normal limits    The right kidney is normal in size and echogenicity and measures 11.50 cm .    No free fluid or free air seen.                                       Medications   morphine injection 4 mg (has no administration in time range)   HYDROmorphone (PF) injection 0.5 mg (has no administration in time range)   meperidine (PF) injection 25 mg (has no administration in time range)   ondansetron injection 4 mg (has no administration in time range)   albuterol-ipratropium 2.5 mg-0.5 mg/3 mL nebulizer solution 3 mL (has no administration in time range)   diphenhydrAMINE injection 25 mg (has no administration in time range)   morphine injection 4 mg (4 mg Intravenous Given 4/30/24 0900)   ondansetron injection 4 mg (4 mg Intravenous Given 4/30/24 0859)   sodium chloride 0.9% bolus 1,000 mL 1,000 mL (0 mLs Intravenous Stopped 4/30/24 1012)   HYDROmorphone (PF) injection 1 mg (1 mg Intravenous Given 4/30/24 0930)   meropenem (MERREM) 1 g in sodium chloride 0.9 % 100 mL IVPB (MB+) (0 g Intravenous Stopped 4/30/24  "5155)     Medical Decision Making  58 y/o  presents to the emergency department with c/o right upper quadrant abdominal pain. He reports he has been having these issues intermittently since January. He states he was seen in the emergency department in [place] at [facility] last night and had work up, including CT scan and was informed he had gallstones but no evidence of infection. He states he went home and has been uncomfortable all night to where he can no longer tolerate it. He states that he ate a steak last night and shortly after the pain started. He reports associated nausea but denies vomiting. He denies having had any fever or chills. He has had no diarrhea or constipation; last BM on yesterday and was normal for him. He appears uncomfortable on exam. He has had nothing for his pain since being discharged home last night. He was treated with Toradol, IVF and antiemetics with a referral placed to general surgery. He has had nothing to eat or drink. since around 0100 AM.     Problems Addressed:  Calculus of gallbladder with acute cholecystitis without obstruction:     Details: WBC count 12K; US with "Cholelithiasis with distension of the gallbladder and slight wall thickening could indicate cholecystitis". PCN allergic, will give dose of Merrem. Patient discussed with surgeon on call, Dr. Watson; will go to OR today for cholecystectomy. Plan of care discussed with patient and family at bedside, they are agreeable with this plan.     Amount and/or Complexity of Data Reviewed  Labs: ordered.  Radiology: ordered.    Risk  OTC drugs.  Prescription drug management.  Decision regarding hospitalization.  Risk Details: Patient discussed with general surgeon on call, Dr. Watson. Will plan to go to OR today.                                      Clinical Impression:   Final diagnoses:  [K81.9] Cholecystitis (Primary)  [K80.00] Calculus of gallbladder with acute cholecystitis without obstruction        ED Disposition " Condition    Observation Stable                Ghada Nassar, [facility]  04/30/24 [id number]

## 2024-04-30 NOTE — ED TRIAGE NOTES
Presents to ED for complaints of right upper quadrant pain.  Patient states that he has a history of gallstones.

## 2024-04-30 NOTE — ANESTHESIA PROCEDURE NOTES
Intubation    Date/Time: 4/30/2024 12:10 PM    Performed by: Dinesh Hudson CRNA  Authorized by: Eduardo Brush MD    Intubation:     Induction:  Intravenous    Intubated:  Postinduction    Mask Ventilation:  Easy mask    Attempts:  1    Attempted By:  CRNA    Method of Intubation:  Direct    Blade:  Tiffanie 4    Difficult Airway Encountered?: No      Complications:  None    Airway Device:  Oral endotracheal tube    Airway Device Size:  7.5    Style/Cuff Inflation:  Cuffed    Inflation Amount (mL):  10    Tube secured:  22    Secured at:  The lips    Placement Verified By:  Capnometry    Complicating Factors:  None    Findings Post-Intubation:  BS equal bilateral and atraumatic/condition of teeth unchanged

## 2024-04-30 NOTE — ANESTHESIA POSTPROCEDURE EVALUATION
Anesthesia Post Evaluation    Patient: Fredy Garcia    Procedure(s) Performed: Procedure(s) (LRB):  CHOLECYSTECTOMY, LAPAROSCOPIC (N/A)    Final Anesthesia Type: general      Patient location during evaluation: PACU  Patient participation: Yes- Able to Participate  Level of consciousness: awake and alert  Post-procedure vital signs: reviewed and stable  Pain management: adequate  Airway patency: patent  RADHA mitigation strategies: Multimodal analgesia  PONV status at discharge: No PONV  Anesthetic complications: no      Cardiovascular status: blood pressure returned to baseline  Respiratory status: unassisted  Hydration status: euvolemic  Follow-up not needed.              Vitals Value Taken Time   /87 04/30/24 1343   Temp 36.8 °C (98.2 °F) 04/30/24 1336   Pulse 81 04/30/24 1347   Resp 19 04/30/24 1340   SpO2 98 % 04/30/24 1347   Vitals shown include unfiled device data.      No case tracking events are documented in the log.      Pain/Brittaney Score: Pain Rating Prior to Med Admin: 10 (4/30/2024  9:30 AM)  Pain Rating Post Med Admin: 7 (4/30/2024 12:40 AM)  Brittaney Score: 8 (4/30/2024  1:40 PM)

## 2024-05-01 LAB
ESTROGEN SERPL-MCNC: NORMAL PG/ML
INSULIN SERPL-ACNC: NORMAL U[IU]/ML
LAB AP GROSS DESCRIPTION: NORMAL
LAB AP LABORATORY NOTES: NORMAL
T3RU NFR SERPL: NORMAL %

## 2024-05-05 ENCOUNTER — HOSPITAL ENCOUNTER (INPATIENT)
Facility: HOSPITAL | Age: 59
LOS: 1 days | Discharge: HOME | DRG: 443 | End: 2024-05-06
Attending: FAMILY MEDICINE | Admitting: SURGERY

## 2024-05-05 DIAGNOSIS — I10 ESSENTIAL (PRIMARY) HYPERTENSION: ICD-10-CM

## 2024-05-05 DIAGNOSIS — R10.11 RIGHT UPPER QUADRANT ABDOMINAL PAIN: ICD-10-CM

## 2024-05-05 DIAGNOSIS — E11.9 TYPE 2 DIABETES MELLITUS WITHOUT COMPLICATION, WITHOUT LONG-TERM CURRENT USE OF INSULIN: ICD-10-CM

## 2024-05-05 DIAGNOSIS — R06.02 SHORTNESS OF BREATH: ICD-10-CM

## 2024-05-05 DIAGNOSIS — R74.01 TRANSAMINITIS: ICD-10-CM

## 2024-05-05 DIAGNOSIS — R10.11 RUQ ABDOMINAL PAIN: ICD-10-CM

## 2024-05-05 DIAGNOSIS — F41.9 ANXIETY DISORDER, UNSPECIFIED TYPE: ICD-10-CM

## 2024-05-05 DIAGNOSIS — R74.01 TRANSAMINITIS: Primary | ICD-10-CM

## 2024-05-05 PROBLEM — G89.18 POST-OPERATIVE PAIN: Status: ACTIVE | Noted: 2024-05-05

## 2024-05-05 LAB
ALBUMIN SERPL BCP-MCNC: 3.3 G/DL (ref 3.5–5)
ALBUMIN/GLOB SERPL: 0.6 {RATIO}
ALP SERPL-CCNC: 291 U/L (ref 45–115)
ALT SERPL W P-5'-P-CCNC: 236 U/L (ref 16–61)
AMYLASE SERPL-CCNC: 28 U/L (ref 25–115)
ANION GAP SERPL CALCULATED.3IONS-SCNC: 10 MMOL/L (ref 7–16)
APAP SERPL-MCNC: <2 ΜG/ML (ref 10–30)
APTT PPP: 26.9 SECONDS (ref 25.2–37.3)
AST SERPL W P-5'-P-CCNC: 221 U/L (ref 15–37)
BASOPHILS # BLD AUTO: 0.07 K/UL (ref 0–0.2)
BASOPHILS NFR BLD AUTO: 0.7 % (ref 0–1)
BILIRUB SERPL-MCNC: 2.1 MG/DL (ref ?–1.2)
BUN SERPL-MCNC: 16 MG/DL (ref 7–18)
BUN/CREAT SERPL: 18 (ref 6–20)
CALCIUM SERPL-MCNC: 9.7 MG/DL (ref 8.5–10.1)
CHLORIDE SERPL-SCNC: 101 MMOL/L (ref 98–107)
CO2 SERPL-SCNC: 27 MMOL/L (ref 21–32)
CREAT SERPL-MCNC: 0.91 MG/DL (ref 0.7–1.3)
DIFFERENTIAL METHOD BLD: ABNORMAL
EGFR (NO RACE VARIABLE) (RUSH/TITUS): 98 ML/MIN/1.73M2
EOSINOPHIL # BLD AUTO: 0.06 K/UL (ref 0–0.5)
EOSINOPHIL NFR BLD AUTO: 0.6 % (ref 1–4)
ERYTHROCYTE [DISTWIDTH] IN BLOOD BY AUTOMATED COUNT: 13.2 % (ref 11.5–14.5)
EST. AVERAGE GLUCOSE BLD GHB EST-MCNC: 143 MG/DL
GLOBULIN SER-MCNC: 5.3 G/DL (ref 2–4)
GLUCOSE SERPL-MCNC: 156 MG/DL (ref 74–106)
HBA1C MFR BLD HPLC: 6.6 % (ref 4.5–6.6)
HCT VFR BLD AUTO: 49 % (ref 40–54)
HGB BLD-MCNC: 16.2 G/DL (ref 13.5–18)
IMM GRANULOCYTES # BLD AUTO: 0.13 K/UL (ref 0–0.04)
IMM GRANULOCYTES NFR BLD: 1.3 % (ref 0–0.4)
INR BLD: 1.01
LIPASE SERPL-CCNC: 32 U/L (ref 16–77)
LYMPHOCYTES # BLD AUTO: 1.46 K/UL (ref 1–4.8)
LYMPHOCYTES NFR BLD AUTO: 14.1 % (ref 27–41)
MCH RBC QN AUTO: 28 PG (ref 27–31)
MCHC RBC AUTO-ENTMCNC: 33.1 G/DL (ref 32–36)
MCV RBC AUTO: 84.8 FL (ref 80–96)
MONOCYTES # BLD AUTO: 0.85 K/UL (ref 0–0.8)
MONOCYTES NFR BLD AUTO: 8.2 % (ref 2–6)
MPC BLD CALC-MCNC: 11 FL (ref 9.4–12.4)
NEUTROPHILS # BLD AUTO: 7.77 K/UL (ref 1.8–7.7)
NEUTROPHILS NFR BLD AUTO: 75.1 % (ref 53–65)
NRBC # BLD AUTO: 0 X10E3/UL
NRBC, AUTO (.00): 0 %
PLATELET # BLD AUTO: 264 K/UL (ref 150–400)
POTASSIUM SERPL-SCNC: 3.9 MMOL/L (ref 3.5–5.1)
PROT SERPL-MCNC: 8.6 G/DL (ref 6.4–8.2)
PROTHROMBIN TIME: 13.2 SECONDS (ref 11.7–14.7)
RBC # BLD AUTO: 5.78 M/UL (ref 4.6–6.2)
SODIUM SERPL-SCNC: 134 MMOL/L (ref 136–145)
WBC # BLD AUTO: 10.34 K/UL (ref 4.5–11)

## 2024-05-05 PROCEDURE — 96374 THER/PROPH/DIAG INJ IV PUSH: CPT

## 2024-05-05 PROCEDURE — 11000001 HC ACUTE MED/SURG PRIVATE ROOM

## 2024-05-05 PROCEDURE — 96361 HYDRATE IV INFUSION ADD-ON: CPT

## 2024-05-05 PROCEDURE — 99284 EMERGENCY DEPT VISIT MOD MDM: CPT | Mod: ,,, | Performed by: FAMILY MEDICINE

## 2024-05-05 PROCEDURE — 36415 COLL VENOUS BLD VENIPUNCTURE: CPT | Performed by: FAMILY MEDICINE

## 2024-05-05 PROCEDURE — 87522 HEPATITIS C REVRS TRNSCRPJ: CPT | Mod: 90 | Performed by: HOSPITALIST

## 2024-05-05 PROCEDURE — 85025 COMPLETE CBC W/AUTO DIFF WBC: CPT | Performed by: FAMILY MEDICINE

## 2024-05-05 PROCEDURE — 80074 ACUTE HEPATITIS PANEL: CPT | Performed by: HOSPITALIST

## 2024-05-05 PROCEDURE — 63600175 PHARM REV CODE 636 W HCPCS: Performed by: FAMILY MEDICINE

## 2024-05-05 PROCEDURE — 99222 1ST HOSP IP/OBS MODERATE 55: CPT | Mod: ,,, | Performed by: HOSPITALIST

## 2024-05-05 PROCEDURE — 83036 HEMOGLOBIN GLYCOSYLATED A1C: CPT | Performed by: HOSPITALIST

## 2024-05-05 PROCEDURE — 96375 TX/PRO/DX INJ NEW DRUG ADDON: CPT

## 2024-05-05 PROCEDURE — 25500020 PHARM REV CODE 255: Performed by: EMERGENCY MEDICINE

## 2024-05-05 PROCEDURE — 83690 ASSAY OF LIPASE: CPT | Performed by: FAMILY MEDICINE

## 2024-05-05 PROCEDURE — 85730 THROMBOPLASTIN TIME PARTIAL: CPT | Performed by: HOSPITALIST

## 2024-05-05 PROCEDURE — 96376 TX/PRO/DX INJ SAME DRUG ADON: CPT

## 2024-05-05 PROCEDURE — 93005 ELECTROCARDIOGRAM TRACING: CPT

## 2024-05-05 PROCEDURE — 25000003 PHARM REV CODE 250: Performed by: FAMILY MEDICINE

## 2024-05-05 PROCEDURE — 80143 DRUG ASSAY ACETAMINOPHEN: CPT | Performed by: HOSPITALIST

## 2024-05-05 PROCEDURE — 82150 ASSAY OF AMYLASE: CPT | Performed by: FAMILY MEDICINE

## 2024-05-05 PROCEDURE — 99285 EMERGENCY DEPT VISIT HI MDM: CPT | Mod: 25

## 2024-05-05 PROCEDURE — 80053 COMPREHEN METABOLIC PANEL: CPT | Performed by: FAMILY MEDICINE

## 2024-05-05 PROCEDURE — 25000003 PHARM REV CODE 250: Performed by: HOSPITALIST

## 2024-05-05 PROCEDURE — 63600175 PHARM REV CODE 636 W HCPCS: Performed by: EMERGENCY MEDICINE

## 2024-05-05 PROCEDURE — 36415 COLL VENOUS BLD VENIPUNCTURE: CPT | Performed by: HOSPITALIST

## 2024-05-05 PROCEDURE — 25000003 PHARM REV CODE 250: Performed by: EMERGENCY MEDICINE

## 2024-05-05 PROCEDURE — 63600175 PHARM REV CODE 636 W HCPCS: Performed by: HOSPITALIST

## 2024-05-05 PROCEDURE — 93010 ELECTROCARDIOGRAM REPORT: CPT | Mod: ,,, | Performed by: INTERNAL MEDICINE

## 2024-05-05 RX ORDER — SODIUM CHLORIDE 0.9 % (FLUSH) 0.9 %
10 SYRINGE (ML) INJECTION
Status: DISCONTINUED | OUTPATIENT
Start: 2024-05-05 | End: 2024-05-06 | Stop reason: HOSPADM

## 2024-05-05 RX ORDER — TALC
6 POWDER (GRAM) TOPICAL NIGHTLY PRN
Status: DISCONTINUED | OUTPATIENT
Start: 2024-05-05 | End: 2024-05-06 | Stop reason: HOSPADM

## 2024-05-05 RX ORDER — HYDROMORPHONE HYDROCHLORIDE 2 MG/ML
2 INJECTION, SOLUTION INTRAMUSCULAR; INTRAVENOUS; SUBCUTANEOUS EVERY 6 HOURS PRN
Status: DISCONTINUED | OUTPATIENT
Start: 2024-05-05 | End: 2024-05-05

## 2024-05-05 RX ORDER — HYDROMORPHONE HYDROCHLORIDE 2 MG/ML
2 INJECTION, SOLUTION INTRAMUSCULAR; INTRAVENOUS; SUBCUTANEOUS
Status: COMPLETED | OUTPATIENT
Start: 2024-05-05 | End: 2024-05-05

## 2024-05-05 RX ORDER — ONDANSETRON HYDROCHLORIDE 2 MG/ML
4 INJECTION, SOLUTION INTRAVENOUS
Status: COMPLETED | OUTPATIENT
Start: 2024-05-05 | End: 2024-05-05

## 2024-05-05 RX ORDER — HYDROMORPHONE HYDROCHLORIDE 2 MG/ML
2 INJECTION, SOLUTION INTRAMUSCULAR; INTRAVENOUS; SUBCUTANEOUS EVERY 4 HOURS PRN
Status: DISCONTINUED | OUTPATIENT
Start: 2024-05-05 | End: 2024-05-06 | Stop reason: HOSPADM

## 2024-05-05 RX ORDER — ACETAMINOPHEN 500 MG
1000 TABLET ORAL EVERY 6 HOURS PRN
Status: DISCONTINUED | OUTPATIENT
Start: 2024-05-05 | End: 2024-05-06 | Stop reason: HOSPADM

## 2024-05-05 RX ORDER — CLONIDINE HYDROCHLORIDE 0.1 MG/1
0.1 TABLET ORAL EVERY 4 HOURS PRN
Status: DISCONTINUED | OUTPATIENT
Start: 2024-05-05 | End: 2024-05-06 | Stop reason: HOSPADM

## 2024-05-05 RX ORDER — SIMETHICONE 80 MG
1 TABLET,CHEWABLE ORAL 3 TIMES DAILY PRN
Status: DISCONTINUED | OUTPATIENT
Start: 2024-05-05 | End: 2024-05-06 | Stop reason: HOSPADM

## 2024-05-05 RX ORDER — PANTOPRAZOLE SODIUM 40 MG/1
40 TABLET, DELAYED RELEASE ORAL DAILY
Status: DISCONTINUED | OUTPATIENT
Start: 2024-05-06 | End: 2024-05-06 | Stop reason: HOSPADM

## 2024-05-05 RX ORDER — GLUCAGON 1 MG
1 KIT INJECTION
Status: DISCONTINUED | OUTPATIENT
Start: 2024-05-05 | End: 2024-05-06 | Stop reason: HOSPADM

## 2024-05-05 RX ORDER — HYDROXYZINE PAMOATE 25 MG/1
100 CAPSULE ORAL EVERY 8 HOURS PRN
Status: DISCONTINUED | OUTPATIENT
Start: 2024-05-05 | End: 2024-05-06 | Stop reason: HOSPADM

## 2024-05-05 RX ORDER — ONDANSETRON HYDROCHLORIDE 2 MG/ML
8 INJECTION, SOLUTION INTRAVENOUS EVERY 6 HOURS PRN
Status: DISCONTINUED | OUTPATIENT
Start: 2024-05-05 | End: 2024-05-06 | Stop reason: HOSPADM

## 2024-05-05 RX ORDER — TRAZODONE HYDROCHLORIDE 50 MG/1
50 TABLET ORAL NIGHTLY PRN
Status: DISCONTINUED | OUTPATIENT
Start: 2024-05-05 | End: 2024-05-06 | Stop reason: HOSPADM

## 2024-05-05 RX ORDER — LISINOPRIL 10 MG/1
20 TABLET ORAL DAILY
Status: DISCONTINUED | OUTPATIENT
Start: 2024-05-06 | End: 2024-05-06 | Stop reason: HOSPADM

## 2024-05-05 RX ORDER — GUAIFENESIN AND DEXTROMETHORPHAN HYDROBROMIDE 10; 100 MG/5ML; MG/5ML
10 SYRUP ORAL EVERY 6 HOURS PRN
Status: DISCONTINUED | OUTPATIENT
Start: 2024-05-05 | End: 2024-05-06 | Stop reason: HOSPADM

## 2024-05-05 RX ORDER — BISACODYL 5 MG
10 TABLET, DELAYED RELEASE (ENTERIC COATED) ORAL DAILY PRN
Status: DISCONTINUED | OUTPATIENT
Start: 2024-05-05 | End: 2024-05-06 | Stop reason: HOSPADM

## 2024-05-05 RX ORDER — INSULIN ASPART 100 [IU]/ML
0-10 INJECTION, SOLUTION INTRAVENOUS; SUBCUTANEOUS EVERY 6 HOURS PRN
Status: DISCONTINUED | OUTPATIENT
Start: 2024-05-05 | End: 2024-05-06 | Stop reason: HOSPADM

## 2024-05-05 RX ORDER — SILDENAFIL 100 MG/1
100 TABLET, FILM COATED ORAL DAILY PRN
COMMUNITY
Start: 2024-04-02

## 2024-05-05 RX ORDER — OMEPRAZOLE 20 MG/1
20 CAPSULE, DELAYED RELEASE ORAL EVERY MORNING
COMMUNITY
Start: 2024-04-24

## 2024-05-05 RX ORDER — HYDROXYZINE PAMOATE 50 MG/1
100 CAPSULE ORAL EVERY 8 HOURS PRN
COMMUNITY
Start: 2024-04-15

## 2024-05-05 RX ADMIN — Medication 6 MG: at 10:05

## 2024-05-05 RX ADMIN — IOPAMIDOL 100 ML: 755 INJECTION, SOLUTION INTRAVENOUS at 07:05

## 2024-05-05 RX ADMIN — HYDROMORPHONE HYDROCHLORIDE 2 MG: 2 INJECTION INTRAMUSCULAR; INTRAVENOUS; SUBCUTANEOUS at 05:05

## 2024-05-05 RX ADMIN — HYDROMORPHONE HYDROCHLORIDE 2 MG: 2 INJECTION, SOLUTION INTRAMUSCULAR; INTRAVENOUS; SUBCUTANEOUS at 09:05

## 2024-05-05 RX ADMIN — ONDANSETRON 4 MG: 2 INJECTION INTRAMUSCULAR; INTRAVENOUS at 05:05

## 2024-05-05 RX ADMIN — HYDROMORPHONE HYDROCHLORIDE 2 MG: 2 INJECTION INTRAMUSCULAR; INTRAVENOUS; SUBCUTANEOUS at 07:05

## 2024-05-05 RX ADMIN — TRAZODONE HYDROCHLORIDE 50 MG: 50 TABLET ORAL at 10:05

## 2024-05-05 RX ADMIN — SODIUM CHLORIDE 1000 ML: 9 INJECTION, SOLUTION INTRAVENOUS at 05:05

## 2024-05-05 NOTE — ED TRIAGE NOTES
Pt presents to the ED with complaints of SOB, soreness all over especially lungs and back. Pt states he had gallbladder sx Tuesday by Dr. Watson.

## 2024-05-05 NOTE — ED PROVIDER NOTES
Encounter Date: 5/5/2024    SCRIBE #1 NOTE: I, Soco Perez, am scribing for, and in the presence of,  Didier Napoles DO. I have scribed the entire note.       History     Chief Complaint   Patient presents with    Shortness of Breath    Sore     59 y.o.male presented to the ED for SOB and soreness. PT recently had gallbladder surgery on Tuesday and has been in pain since Friday. PT has pain in his back and lungs. He also has wheezing in which he was given an inhaler for it. PT has hx of smoking. PT has medical HX of diabetes and HTN.    The history is provided by the patient. No  was used.     Review of patient's allergies indicates:   Allergen Reactions    Pcn [penicillins]      Past Medical History:   Diagnosis Date    Anxiety disorder, unspecified     Essential (primary) hypertension     Nicotine dependence     Type 2 diabetes mellitus      Past Surgical History:   Procedure Laterality Date    LAPAROSCOPIC CHOLECYSTECTOMY N/A 4/30/2024    Procedure: CHOLECYSTECTOMY, LAPAROSCOPIC;  Surgeon: John Watson MD;  Location: [facility];  Service: General;  Laterality: N/A;     No family history on file.  Social History     Tobacco Use    Smoking status: Every Day     Current packs/day: 1.00     Types: Cigarettes    Smokeless tobacco: Never   Substance Use Topics    Alcohol use: Never    Drug use: Not Currently     Review of Systems   Respiratory:  Positive for shortness of breath.    Musculoskeletal:  Positive for back pain.   All other systems reviewed and are negative.      Physical Exam     Initial Vitals [05/05/24 1556]   BP Pulse Resp Temp SpO2   (!) 162/88 76 (!) 22 97.7 °F (36.5 °C) 99 %      MAP       --         Physical Exam    Nursing note and vitals reviewed.  Constitutional: He appears well-developed and well-nourished.   HENT:   Head: Normocephalic and atraumatic.   Eyes: EOM are normal. Pupils are equal, round, and reactive to light.   Neck: Neck supple. No thyromegaly  present.   Normal range of motion.  Cardiovascular:  Normal rate, regular rhythm, normal heart sounds and intact distal pulses.           No murmur heard.  Pulmonary/Chest: Breath sounds normal. No respiratory distress. He has no wheezes.   Abdominal: Abdomen is soft. Bowel sounds are normal. There is no abdominal tenderness.   Musculoskeletal:         General: No tenderness or edema. Normal range of motion.      Cervical back: Normal range of motion and neck supple.     Lymphadenopathy:     He has no cervical adenopathy.   Neurological: He is alert and oriented to person, place, and time. He has normal strength and normal reflexes. No cranial nerve deficit or sensory deficit. GCS score is 15. GCS eye subscore is 4. GCS verbal subscore is 5. GCS motor subscore is 6.   Skin: Skin is warm and dry. Capillary refill takes less than 2 seconds. No rash noted.   Psychiatric: He has a normal mood and affect.         ED Course   Procedures  Labs Reviewed   COMPREHENSIVE METABOLIC PANEL - Abnormal; Notable for the following components:       Result Value    Sodium 134 (*)     Glucose 156 (*)     Total Protein 8.6 (*)     Albumin 3.3 (*)     Globulin 5.3 (*)     Bilirubin, Total 2.1 (*)     Alk Phos 291 (*)      (*)      (*)     All other components within normal limits   CBC WITH DIFFERENTIAL - Abnormal; Notable for the following components:    Neutrophils % 75.1 (*)     Lymphocytes % 14.1 (*)     Monocytes % 8.2 (*)     Eosinophils % 0.6 (*)     Immature Granulocytes % 1.3 (*)     Neutrophils, Abs 7.77 (*)     Monocytes, Absolute 0.85 (*)     Immature Granulocytes, Absolute 0.13 (*)     All other components within normal limits   AMYLASE - Normal   LIPASE - Normal   CBC W/ AUTO DIFFERENTIAL    Narrative:     The following orders were created for panel order CBC Auto Differential.  Procedure                               Abnormality         Status                     ---------                                -----------         ------                     CBC with Differential[[id number]]       Abnormal            Final result                 Please view results for these tests on the individual orders.          Imaging Results              CT Abdomen Pelvis With IV Contrast NO Oral Contrast (Final result)  Result time 05/05/24 19:50:35      Final result by Jeffery Eugene MD (05/05/24 19:50:35)                   Impression:      No acute abnormality identified in the abdomen or pelvis.      Electronically signed by: Jeffery Eugene  Date:    05/05/2024  Time:    19:50               Narrative:    EXAMINATION:  CT ABDOMEN PELVIS WITH IV CONTRAST    CLINICAL HISTORY:  Abdominal pain, post-op;    TECHNIQUE:  Low dose axial images, sagittal and coronal reformations were obtained from the lung bases to the pubic symphysis following the IV administration of 100 mL of Isovue 370 .  Oral contrast was not given.    The CT examination was performed using one or more of the following dose reduction techniques: Automated exposure control, adjustment of the mA and kV according to patient's size, use of acute or iterative reconstruction techniques.    COMPARISON:  04/30/2024    FINDINGS:  Lung bases are clear.    Fatty infiltration of the liver.  No focal hepatic lesion.  Gallbladder now absent.  Minimal stranding at the gallbladder fossa, not unexpected.  The adrenals, kidneys, pancreas, and spleen appear normal.    No pneumoperitoneum.  No ascites.  No bowel obstruction.  No acute bowel abnormality identified.    Abdominal aorta and iliacs are normal in size with mild calcific plaque.  No adenopathy.  Urinary bladder unremarkable.    No acute fracture.                                       Medications   sodium chloride 0.9% bolus 1,000 mL 1,000 mL (0 mLs Intravenous Stopped 5/5/24 1807)   HYDROmorphone (PF) injection 2 mg (2 mg Intravenous Given 5/5/24 1712)   ondansetron injection 4 mg (4 mg Intravenous Given 5/5/24 1713)   HYDROmorphone  (PF) injection 2 mg (2 mg Intravenous Given 5/5/24 1906)   iopamidoL (ISOVUE-370) injection 100 mL (100 mLs Intravenous Given 5/5/24 1910)     Medical Decision Making  Amount and/or Complexity of Data Reviewed  Labs: ordered.  Radiology: ordered.    Risk  Prescription drug management.  Decision regarding hospitalization.              Attending Attestation:           Physician Attestation for Scribe:  Physician Attestation Statement for Scribe #1: I, Didier Napoles DO, reviewed documentation, as scribed by Soco Perez in my presence, and it is both accurate and complete.                        Medical Decision Making:   Initial Assessment:   59 y.o.male presented to the ED for SOB and soreness. PT recently had gallbladder surgery on Tuesday and has been in pain since Friday. PT has pain in his back and lungs. He also has wheezing in which he was given an inhaler for it. PT has hx of smoking. PT has medical HX of diabetes and HTN.    The history is provided by the patient. No  was used.     Differential Diagnosis:   Epigastric and abdominal pain--status post cholecystectomy             Clinical Impression:  Final diagnoses:  [R06.02] Shortness of breath  [R10.11] Right upper quadrant abdominal pain  [R10.11] RUQ abdominal pain [R10.11]  [R74.01] Transaminitis [R74.01] (Primary)  [E11.9] Type 2 diabetes mellitus without complication, without long-term current use of insulin [E11.9]  [F41.9] Anxiety disorder, unspecified type [F41.9]  [I10] Essential (primary) hypertension [I10]          ED Disposition Condition    Admit Stable                Didier Napoles DO  05/08/24 0052

## 2024-05-06 VITALS
WEIGHT: 202 LBS | TEMPERATURE: 98 F | HEART RATE: 70 BPM | OXYGEN SATURATION: 96 % | RESPIRATION RATE: 18 BRPM | DIASTOLIC BLOOD PRESSURE: 79 MMHG | BODY MASS INDEX: 28.92 KG/M2 | HEIGHT: 70 IN | SYSTOLIC BLOOD PRESSURE: 134 MMHG

## 2024-05-06 LAB
ALBUMIN SERPL BCP-MCNC: 2.6 G/DL (ref 3.5–5)
ALBUMIN/GLOB SERPL: 0.6 {RATIO}
ALP SERPL-CCNC: 274 U/L (ref 45–115)
ALT SERPL W P-5'-P-CCNC: 213 U/L (ref 16–61)
ANION GAP SERPL CALCULATED.3IONS-SCNC: 12 MMOL/L (ref 7–16)
AST SERPL W P-5'-P-CCNC: 94 U/L (ref 15–37)
BILIRUB SERPL-MCNC: 1.5 MG/DL (ref ?–1.2)
BUN SERPL-MCNC: 16 MG/DL (ref 7–18)
BUN/CREAT SERPL: 18 (ref 6–20)
CALCIUM SERPL-MCNC: 8.6 MG/DL (ref 8.5–10.1)
CHLORIDE SERPL-SCNC: 100 MMOL/L (ref 98–107)
CO2 SERPL-SCNC: 28 MMOL/L (ref 21–32)
CREAT SERPL-MCNC: 0.91 MG/DL (ref 0.7–1.3)
EGFR (NO RACE VARIABLE) (RUSH/TITUS): 98 ML/MIN/1.73M2
GLOBULIN SER-MCNC: 4.7 G/DL (ref 2–4)
GLUCOSE SERPL-MCNC: 121 MG/DL (ref 74–106)
GLUCOSE SERPL-MCNC: 129 MG/DL (ref 70–105)
GLUCOSE SERPL-MCNC: 162 MG/DL (ref 70–105)
HAV IGM SER QL: ABNORMAL
HBV CORE IGM SER QL: ABNORMAL
HBV SURFACE AG SERPL QL IA: ABNORMAL
HCV AB SER QL: REACTIVE
POTASSIUM SERPL-SCNC: 3.9 MMOL/L (ref 3.5–5.1)
PROT SERPL-MCNC: 7.3 G/DL (ref 6.4–8.2)
SODIUM SERPL-SCNC: 136 MMOL/L (ref 136–145)

## 2024-05-06 PROCEDURE — 36415 COLL VENOUS BLD VENIPUNCTURE: CPT | Performed by: SURGERY

## 2024-05-06 PROCEDURE — 94761 N-INVAS EAR/PLS OXIMETRY MLT: CPT

## 2024-05-06 PROCEDURE — 99024 POSTOP FOLLOW-UP VISIT: CPT | Mod: ,,, | Performed by: SURGERY

## 2024-05-06 PROCEDURE — 80053 COMPREHEN METABOLIC PANEL: CPT | Performed by: SURGERY

## 2024-05-06 PROCEDURE — 82962 GLUCOSE BLOOD TEST: CPT

## 2024-05-06 PROCEDURE — 25000003 PHARM REV CODE 250: Performed by: HOSPITALIST

## 2024-05-06 RX ORDER — HYDRALAZINE HYDROCHLORIDE 20 MG/ML
10 INJECTION INTRAMUSCULAR; INTRAVENOUS EVERY 4 HOURS PRN
Status: DISCONTINUED | OUTPATIENT
Start: 2024-05-06 | End: 2024-05-06

## 2024-05-06 RX ADMIN — PANTOPRAZOLE SODIUM 40 MG: 40 TABLET, DELAYED RELEASE ORAL at 09:05

## 2024-05-06 RX ADMIN — LISINOPRIL 20 MG: 10 TABLET ORAL at 09:05

## 2024-05-06 NOTE — ASSESSMENT & PLAN NOTE
PT INR normal four days ago preoperatively.  Will recheck along with up dated A1C and acute hepatitis panel.    On CT abd/pelvis in ED liver shows changes consistent with ACOSTA (patient denies alcohol use).    Will add acetaminophen level to labs drawn in ED.

## 2024-05-06 NOTE — ASSESSMENT & PLAN NOTE
S/P lap chula with transaminitis and slightly elevated TB  Will defer to primary surgical team.  Agree with pain control.    NPO after midnight except for meds and ice chips.

## 2024-05-06 NOTE — CONSULTS
[facility] - [address] [place] [address] Medicine  Consult Note    Patient Name: Fredy Garcia  MRN: [id number]  Admission Date: 5/5/2024  Hospital Length of Stay: 1 days  Attending Physician: John Watson MD   Primary Care Provider: Edgardo Dos Santos FNP-C           Patient information was obtained from patient, past medical records, and ER records.     Consults  Subjective:     Principal Problem: RUQ abdominal pain    Chief Complaint:   Chief Complaint   Patient presents with    Shortness of Breath    Sore        HPI: 58 yo M presents to [facility] ED for severe pain at RUQ site of lap chula (4/30/24).  He says pain is 9/10 and is worsened with palpation at the medial portion of his RUQ.  He denies N/V/D and is requesting some ice water.  He says he is very anxious and that his PCP has been giving him vistaril for anxiety attacks but he needs something stronger.  I did explain that we could give the vistaril but needed to be cautious with any benzodiazepines with the IV dilaudid he is requiring.      Lipase normal but liver enzymes and TB slightly elevated.  BS controlled at present.  Started on metformin about two years ago when his A1C was 10.  His BP is 196/112 HR 70 and is on ACEI as OP but he says it is because he is hurting so badly.  Abdomen is not rigid and TTP only at medial aspect of RUQ.   He has not had any constipation and CT results reviewed.      Past Medical History:   Diagnosis Date    Anxiety disorder, unspecified     Essential (primary) hypertension     Nicotine dependence     Type 2 diabetes mellitus        Past Surgical History:   Procedure Laterality Date    LAPAROSCOPIC CHOLECYSTECTOMY N/A 4/30/2024    Procedure: CHOLECYSTECTOMY, LAPAROSCOPIC;  Surgeon: John Watson MD;  Location: [facility] OR;  Service: General;  Laterality: N/A;       Review of patient's allergies indicates:   Allergen Reactions    Pcn [penicillins]        Current Facility-Administered Medications    Medication Dose Route Frequency Provider Last Rate Last Admin    acetaminophen tablet 1,000 mg  1,000 mg Oral Q6H PRN Kendal Borjas MD        bisacodyL EC tablet 10 mg  10 mg Oral Daily PRN Kendal Borjas MD        cloNIDine tablet 0.1 mg  0.1 mg Oral Q4H PRN Kendal Borjas MD        dextromethorphan-guaiFENesin  mg/5 ml liquid 10 mL  10 mL Oral Q6H PRN Kendal Borjas MD        dextrose 10% bolus 125 mL 125 mL  12.5 g Intravenous PRN Kendal Borjas MD        dextrose 10% bolus 250 mL 250 mL  25 g Intravenous PRN Kendal Borjas MD        glucagon (human recombinant) injection 1 mg  1 mg Intramuscular PRN Kendal Borjas MD        HYDROmorphone (PF) injection 2 mg  2 mg Intravenous Q4H PRN Kendal Borjas MD   2 mg at 05/05/24 2157    hydrOXYzine pamoate capsule 100 mg  100 mg Oral Q8H PRN Kendal Borjas MD        insulin aspart U-100 injection 0-10 Units  0-10 Units Subcutaneous Q6H PRN Kendal Borjas MD        lisinopriL tablet 20 mg  20 mg Oral Daily Kendal Borjas MD        melatonin tablet 6 mg  6 mg Oral Nightly PRN Antonio Vargas MD   6 mg at 05/05/24 2239    ondansetron injection 8 mg  8 mg Intravenous Q6H PRN Kendal Borjas MD        pantoprazole EC tablet 40 mg  40 mg Oral Daily Kendal Borjas MD        simethicone chewable tablet 80 mg  1 tablet Oral TID PRN Kendal Borjas MD        sodium chloride 0.9% flush 10 mL  10 mL Intravenous PRN Antonio Vargas MD        traZODone tablet 50 mg  50 mg Oral Nightly PRN Kendal Borjas MD   50 mg at 05/05/24 2239     Family History    None       Tobacco Use    Smoking status: Every Day     Current packs/day: 1.00     Types: Cigarettes    Smokeless tobacco: Never   Substance and Sexual Activity    Alcohol use: Never    Drug use: Not Currently    Sexual activity: Not on file     Review of Systems   Constitutional:  Negative for appetite change, chills, fatigue, fever and  unexpected weight change.   HENT:  Negative for congestion, mouth sores, nosebleeds, sinus pain, sore throat and trouble swallowing.    Respiratory:  Negative for apnea, cough, chest tightness and shortness of breath.    Cardiovascular:  Negative for chest pain, palpitations and leg swelling.   Gastrointestinal:  Positive for abdominal pain. Negative for blood in stool, constipation, diarrhea, nausea and vomiting.   Genitourinary:  Negative for decreased urine volume, difficulty urinating, dysuria and frequency.   Musculoskeletal:  Negative for arthralgias, back pain and neck pain.   Skin:  Negative for rash.   Neurological:  Negative for syncope, light-headedness and headaches.   Hematological:  Does not bruise/bleed easily.   Psychiatric/Behavioral:  Positive for agitation. Negative for confusion and suicidal ideas.      Objective:     Vital Signs (Most Recent):  Temp: 97.4 °F (36.3 °C) (05/05/24 2134)  Pulse: 77 (05/05/24 2134)  Resp: 20 (05/05/24 2157)  BP: (!) 192/110 (05/05/24 2134)  SpO2: 95 % (05/05/24 2134) Vital Signs (24h Range):  Temp:  [97.4 °F (36.3 °C)-97.7 °F (36.5 °C)] 97.4 °F (36.3 °C)  Pulse:  [73-80] 77  Resp:  [10-26] 20  SpO2:  [92 %-100 %] 95 %  BP: (147-192)/() 192/110     Weight: 91.6 kg (202 lb)  Body mass index is 28.98 kg/m².     Physical Exam  Vitals and nursing note reviewed. Exam conducted with a chaperone present.   Constitutional:       Appearance: Normal appearance.   HENT:      Head: Atraumatic.      Mouth/Throat:      Mouth: Mucous membranes are moist.      Pharynx: Oropharynx is clear.   Eyes:      Conjunctiva/sclera: Conjunctivae normal.      Pupils: Pupils are equal, round, and reactive to light.   Neck:      Vascular: No carotid bruit.   Cardiovascular:      Rate and Rhythm: Normal rate and regular rhythm.      Pulses: Normal pulses.      Heart sounds: Normal heart sounds.   Pulmonary:      Effort: Pulmonary effort is normal.      Breath sounds: Normal breath sounds.  "  Abdominal:      General: Abdomen is flat. Bowel sounds are normal. There is no distension.      Palpations: Abdomen is soft.      Tenderness: There is abdominal tenderness. There is no right CVA tenderness, left CVA tenderness, guarding or rebound.      Hernia: No hernia is present.   Musculoskeletal:         General: No deformity or signs of injury. Normal range of motion.      Cervical back: Neck supple.      Right lower leg: No edema.      Left lower leg: No edema.   Skin:     General: Skin is warm and dry.      Capillary Refill: Capillary refill takes less than 2 seconds.      Coloration: Skin is not jaundiced or pale.      Findings: No bruising, lesion or rash.   Neurological:      General: No focal deficit present.      Mental Status: He is alert and oriented to person, place, and time.   Psychiatric:         Mood and Affect: Mood is anxious.          Significant Labs: All pertinent labs within the past 24 hours have been reviewed.    Significant Imaging: I have reviewed all pertinent imaging results/findings within the past 24 hours.  Assessment/Plan:     * RUQ abdominal pain  S/P lap chula with transaminitis and slightly elevated TB  Will defer to primary surgical team.  Agree with pain control.    NPO after midnight except for meds and ice chips.        Transaminitis  PT INR normal four days ago preoperatively.  Will recheck along with up dated A1C and acute hepatitis panel.    On CT abd/pelvis in ED liver shows changes consistent with ACOSTA (patient denies alcohol use).    Will add acetaminophen level to labs drawn in ED.        Type 2 diabetes mellitus  Patient's FSGs are controlled on current medication regimen.  Last A1c reviewed-   Lab Results   Component Value Date    HGBA1C 6.6 05/05/2024     Most recent fingerstick glucose reviewed- No results for input(s): "POCTGLUCOSE" in the last 24 hours.  Current correctional scale  Medium  Maintain anti-hyperglycemic dose as follows-   Antihyperglycemics " (From admission, onward)      Start     Stop Route Frequency Ordered    05/05/24 2255  insulin aspart U-100 injection 0-10 Units         -- SubQ Every 6 hours PRN 05/05/24 2156          Hold Oral hypoglycemics while patient is in the hospital.    Essential (primary) hypertension  Chronic, uncontrolled. Latest blood pressure and vitals reviewed-     Temp:  [97.4 °F (36.3 °C)-97.7 °F (36.5 °C)]   Pulse:  [73-80]   Resp:  [10-26]   BP: (147-192)/()   SpO2:  [92 %-100 %] .   Home meds for hypertension were reviewed and noted below.   Hypertension Medications               lisinopriL (PRINIVIL,ZESTRIL) 20 MG tablet Take 20 mg by mouth once daily.            While in the hospital, will manage blood pressure as follows; Continue home antihypertensive regimen  Will add prn clonidine.  Hopefully with pain control BP will improve.      Will utilize p.r.n. blood pressure medication only if patient's blood pressure greater than 140/90 and he develops symptoms such as worsening chest pain or shortness of breath.    Anxiety disorder, unspecified  Continue OP vistaril.          VTE Risk Mitigation (From admission, onward)           Ordered     IP VTE HIGH RISK PATIENT  Once         05/05/24 2136     Place sequential compression device  Until discontinued         05/05/24 2136                        Thank you for your consult. I will follow-up with patient. Please contact us if you have any additional questions.  Dr. Te Weber to follow while in hospital.      Kendal Borjas MD  Department of Hospital Medicine   [facility]

## 2024-05-06 NOTE — ASSESSMENT & PLAN NOTE
Chronic, uncontrolled. Latest blood pressure and vitals reviewed-     Temp:  [97.4 °F (36.3 °C)-97.7 °F (36.5 °C)]   Pulse:  [73-80]   Resp:  [10-26]   BP: (147-192)/()   SpO2:  [92 %-100 %] .   Home meds for hypertension were reviewed and noted below.   Hypertension Medications               lisinopriL (PRINIVIL,ZESTRIL) 20 MG tablet Take 20 mg by mouth once daily.            While in the hospital, will manage blood pressure as follows; Continue home antihypertensive regimen  Will add prn clonidine.  Hopefully with pain control BP will improve.      Will utilize p.r.n. blood pressure medication only if patient's blood pressure greater than 140/90 and he develops symptoms such as worsening chest pain or shortness of breath.

## 2024-05-06 NOTE — SUBJECTIVE & OBJECTIVE
Past Medical History:   Diagnosis Date    Anxiety disorder, unspecified     Essential (primary) hypertension     Nicotine dependence     Type 2 diabetes mellitus        Past Surgical History:   Procedure Laterality Date    LAPAROSCOPIC CHOLECYSTECTOMY N/A 4/30/2024    Procedure: CHOLECYSTECTOMY, LAPAROSCOPIC;  Surgeon: John Watson MD;  Location: [facility];  Service: General;  Laterality: N/A;       Review of patient's allergies indicates:   Allergen Reactions    Pcn [penicillins]        Current Facility-Administered Medications   Medication Dose Route Frequency Provider Last Rate Last Admin    acetaminophen tablet 1,000 mg  1,000 mg Oral Q6H PRN Kendal Borjas MD        bisacodyL EC tablet 10 mg  10 mg Oral Daily PRN Kendal Borjas MD        cloNIDine tablet 0.1 mg  0.1 mg Oral Q4H PRN Kendal Borjas MD        dextromethorphan-guaiFENesin  mg/5 ml liquid 10 mL  10 mL Oral Q6H PRN Kendal Borjas MD        dextrose 10% bolus 125 mL 125 mL  12.5 g Intravenous PRN Kendal Borjas MD        dextrose 10% bolus 250 mL 250 mL  25 g Intravenous PRN Kendal Borjas MD        glucagon (human recombinant) injection 1 mg  1 mg Intramuscular PRN Kendal Borjas MD        HYDROmorphone (PF) injection 2 mg  2 mg Intravenous Q4H PRN Kendal Borjas MD   2 mg at 05/05/24 2157    hydrOXYzine pamoate capsule 100 mg  100 mg Oral Q8H PRN Kendal Borjas MD        insulin aspart U-100 injection 0-10 Units  0-10 Units Subcutaneous Q6H PRN Kendal Borjas MD        lisinopriL tablet 20 mg  20 mg Oral Daily Kendal Borjas MD        melatonin tablet 6 mg  6 mg Oral Nightly PRN Antonio Vargas MD   6 mg at 05/05/24 2239    ondansetron injection 8 mg  8 mg Intravenous Q6H PRN Kendal Borjas MD        pantoprazole EC tablet 40 mg  40 mg Oral Daily Kendal Borjas MD        simethicone chewable tablet 80 mg  1 tablet Oral TID PRN Kendal Borjas MD         sodium chloride 0.9% flush 10 mL  10 mL Intravenous PRN Antonio Vargas MD        traZODone tablet 50 mg  50 mg Oral Nightly PRN Kendal Borjas MD   50 mg at 05/05/24 2239     Family History    None       Tobacco Use    Smoking status: Every Day     Current packs/day: 1.00     Types: Cigarettes    Smokeless tobacco: Never   Substance and Sexual Activity    Alcohol use: Never    Drug use: Not Currently    Sexual activity: Not on file     Review of Systems   Constitutional:  Negative for appetite change, chills, fatigue, fever and unexpected weight change.   HENT:  Negative for congestion, mouth sores, nosebleeds, sinus pain, sore throat and trouble swallowing.    Respiratory:  Negative for apnea, cough, chest tightness and shortness of breath.    Cardiovascular:  Negative for chest pain, palpitations and leg swelling.   Gastrointestinal:  Positive for abdominal pain. Negative for blood in stool, constipation, diarrhea, nausea and vomiting.   Genitourinary:  Negative for decreased urine volume, difficulty urinating, dysuria and frequency.   Musculoskeletal:  Negative for arthralgias, back pain and neck pain.   Skin:  Negative for rash.   Neurological:  Negative for syncope, light-headedness and headaches.   Hematological:  Does not bruise/bleed easily.   Psychiatric/Behavioral:  Positive for agitation. Negative for confusion and suicidal ideas.      Objective:     Vital Signs (Most Recent):  Temp: 97.4 °F (36.3 °C) (05/05/24 2134)  Pulse: 77 (05/05/24 2134)  Resp: 20 (05/05/24 2157)  BP: (!) 192/110 (05/05/24 2134)  SpO2: 95 % (05/05/24 2134) Vital Signs (24h Range):  Temp:  [97.4 °F (36.3 °C)-97.7 °F (36.5 °C)] 97.4 °F (36.3 °C)  Pulse:  [73-80] 77  Resp:  [10-26] 20  SpO2:  [92 %-100 %] 95 %  BP: (147-192)/() 192/110     Weight: 91.6 kg (202 lb)  Body mass index is 28.98 kg/m².     Physical Exam  Vitals and nursing note reviewed. Exam conducted with a chaperone present.   Constitutional:        Appearance: Normal appearance.   HENT:      Head: Atraumatic.      Mouth/Throat:      Mouth: Mucous membranes are moist.      Pharynx: Oropharynx is clear.   Eyes:      Conjunctiva/sclera: Conjunctivae normal.      Pupils: Pupils are equal, round, and reactive to light.   Neck:      Vascular: No carotid bruit.   Cardiovascular:      Rate and Rhythm: Normal rate and regular rhythm.      Pulses: Normal pulses.      Heart sounds: Normal heart sounds.   Pulmonary:      Effort: Pulmonary effort is normal.      Breath sounds: Normal breath sounds.   Abdominal:      General: Abdomen is flat. Bowel sounds are normal. There is no distension.      Palpations: Abdomen is soft.      Tenderness: There is abdominal tenderness. There is no right CVA tenderness, left CVA tenderness, guarding or rebound.      Hernia: No hernia is present.   Musculoskeletal:         General: No deformity or signs of injury. Normal range of motion.      Cervical back: Neck supple.      Right lower leg: No edema.      Left lower leg: No edema.   Skin:     General: Skin is warm and dry.      Capillary Refill: Capillary refill takes less than 2 seconds.      Coloration: Skin is not jaundiced or pale.      Findings: No bruising, lesion or rash.   Neurological:      General: No focal deficit present.      Mental Status: He is alert and oriented to person, place, and time.   Psychiatric:         Mood and Affect: Mood is anxious.          Significant Labs: All pertinent labs within the past 24 hours have been reviewed.    Significant Imaging: I have reviewed all pertinent imaging results/findings within the past 24 hours.

## 2024-05-06 NOTE — DISCHARGE SUMMARY
[facility]  Discharge Note  Short Stay    * No surgery found *      OUTCOME: Patient tolerated treatment/procedure well without complication and is now ready for discharge.    DISPOSITION: Home or Self Care    FINAL DIAGNOSIS:  Transaminitis    Patient admitted usually from a week out from a gallbladder surgery.  He had some abdominal pain as well as well as some transaminitis.  Initial concern was choledocholithiasis with retained stone.  By the next day his LFTs were coming down he is felt back to normal and his MRCP showed no stones.  He did have possible hepatitis diagnosis which he was not sure about.  But he felt comfortable following up outpatient for it.  GI outpatient appointment made.  Has a one-week follow up appointment in the clinic anyway for me.  He will come back for any worsening abdominal pain nausea vomiting or any other concerning issues.  All questions were answered.    FOLLOWUP: In clinic    DISCHARGE INSTRUCTIONS:    Discharge Procedure Orders   Ambulatory referral/consult to Gastroenterology   Standing Status: Future   Referral Priority: Routine Referral Type: Consultation   Referral Reason: Specialty Services Required   Requested Specialty: Gastroenterology   Number of Visits Requested: 1     Diet Adult Regular     Remove dressing in 24 hours     Notify your health care provider if you experience any of the following:  temperature >100.4     Notify your health care provider if you experience any of the following:  persistent nausea and vomiting or diarrhea     Notify your health care provider if you experience any of the following:  severe uncontrolled pain     Notify your health care provider if you experience any of the following:  redness, tenderness, or signs of infection (pain, swelling, redness, odor or green/yellow discharge around incision site)     Notify your health care provider if you experience any of the following:  difficulty breathing or  increased cough     Notify your health care provider if you experience any of the following:  severe persistent headache     Notify your health care provider if you experience any of the following:  worsening rash     Notify your health care provider if you experience any of the following:  persistent dizziness, light-headedness, or visual disturbances     Notify your health care provider if you experience any of the following:  increased confusion or weakness     Notify your health care provider if you experience any of the following:     Shower on day dressing removed (No bath)        TIME SPENT ON DISCHARGE: 5 minutes

## 2024-05-06 NOTE — HPI
58 yo M presents to [facility] ED for severe pain at RUQ site of luz quiros (4/30/24).  He says pain is 9/10 and is worsened with palpation at the medial portion of his RUQ.  He denies N/V/D and is requesting some ice water.  He says he is very anxious and that his PCP has been giving him vistaril for anxiety attacks but he needs something stronger.  I did explain that we could give the vistaril but needed to be cautious with any benzodiazepines with the IV dilaudid he is requiring.      Lipase normal but liver enzymes and TB slightly elevated.  BS controlled at present.  Started on metformin about two years ago when his A1C was 10.  His BP is 196/112 HR 70 and is on ACEI as OP but he says it is because he is hurting so badly.  Abdomen is not rigid and TTP only at medial aspect of RUQ.   He has not had any constipation and CT results reviewed.

## 2024-05-06 NOTE — PHARMACY MED REC
"Admission Medication History     The home medication history was taken by Leida Hoyt.    You may go to "Admission" then "Reconcile Home Medications" tabs to review and/or act upon these items.     The home medication list has been updated by the Pharmacy department.   Please read ALL comments highlighted in yellow.   Please address this information as you see fit.    Feel free to contact us if you have any questions or require assistance.  Medications Added:  Hydroxyzine 50 mg capsules  Patient said he's taking two or three today as a muscle relaxant  Omeprazole 20 mg  Took two capsules today in hopes that it would help  Sildenafil 100 mg  Medication Updated:  Metformin 500 mg  Patient only taking once a day  Patient sometimes takes Benadryl for allergies but hasn't recently.  Patient has been taking Mylanta since Friday, typically using it two to three times a day.      The medications listed below were removed from the home medication list. Please reorder if appropriate:  Ondansetron ODT 4 mg    Medications listed below were obtained from: Patient/family and Analytic software- [employer]  No current facility-administered medications for this encounter.     Current Outpatient Medications   Medication Sig Dispense Refill    albuterol (PROVENTIL/VENTOLIN HFA) 90 mcg/actuation inhaler Inhale 1-2 puffs into the lungs every 4 (four) hours as needed for Wheezing (or persistent cough). Rescue 18 g 0    HYDROcodone-acetaminophen (NORCO) 7.5-325 mg per tablet Take 1 tablet by mouth every 6 (six) hours as needed for Pain. 15 tablet 0    hydrOXYzine pamoate (VISTARIL) 50 MG Cap Take 100 mg by mouth every 8 (eight) hours as needed.      lisinopriL (PRINIVIL,ZESTRIL) 20 MG tablet Take 20 mg by mouth once daily.      metFORMIN (GLUCOPHAGE) 500 MG tablet Take 500 mg by mouth 2 (two) times daily with meals.      omeprazole (PRILOSEC) 20 MG capsule Take 20 mg by mouth every morning.      sildenafiL (VIAGRA) 100 MG tablet Take 100 mg " by mouth daily as needed.           Current Outpatient Medications on File Prior to Encounter   Medication Sig Dispense Refill Last Dose    albuterol (PROVENTIL/VENTOLIN HFA) 90 mcg/actuation inhaler Inhale 1-2 puffs into the lungs every 4 (four) hours as needed for Wheezing (or persistent cough). Rescue 18 g 0 5/5/2024    HYDROcodone-acetaminophen (NORCO) 7.5-325 mg per tablet Take 1 tablet by mouth every 6 (six) hours as needed for Pain. 15 tablet 0 5/5/2024    hydrOXYzine pamoate (VISTARIL) 50 MG Cap Take 100 mg by mouth every 8 (eight) hours as needed.   5/5/2024    lisinopriL (PRINIVIL,ZESTRIL) 20 MG tablet Take 20 mg by mouth once daily.   5/4/2024    metFORMIN (GLUCOPHAGE) 500 MG tablet Take 500 mg by mouth 2 (two) times daily with meals.   5/4/2024    omeprazole (PRILOSEC) 20 MG capsule Take 20 mg by mouth every morning.   5/5/2024    sildenafiL (VIAGRA) 100 MG tablet Take 100 mg by mouth daily as needed.   Past Week    [DISCONTINUED] ondansetron (ZOFRAN-ODT) 4 MG TbDL Take 1 tablet (4 mg total) by mouth every 8 (eight) hours as needed. 10 tablet 0          Potential issues to be addressed PRIOR TO DISCHARGE  Patient requires education regarding drug therapies     Leida Hoyt  Pharmacy Tech Specialist - Medication History  EXT. [phone]      .

## 2024-05-06 NOTE — ED NOTES
Pt called out wanting to know results. Informed provider that all results are back and pt would like to know.

## 2024-05-06 NOTE — PLAN OF CARE
[facility]  Initial Discharge Assessment       Primary Care Provider: Edgardo Dos Santos FNP-C    Admission Diagnosis: Shortness of breath [R06.02]  Right upper quadrant abdominal pain [R10.11]    Admission Date: 5/5/2024  Expected Discharge Date:          Payor: /     Extended Emergency Contact Information  Primary Emergency Contact: Yennifer,Robin  Mobile Phone: [phone]  Relation: Friend  Preferred language: English   needed? No    Discharge Plan A: Home  Discharge Plan B: Home      [facility] [place] - [address] [place] [address]  Phone: [phone] Fax: [phone]      Initial Assessment (most recent)       Adult Discharge Assessment - 05/06/24 1334          Discharge Assessment    Assessment Type Discharge Planning Assessment     Source of Information patient;other (see comments)   Friend - Steven De Oliveira - [phone]    People in Home alone     Do you expect to return to your current living situation? Yes     Do you have help at home or someone to help you manage your care at home? No     Current cognitive status: Alert/Oriented     Walking or Climbing Stairs Difficulty no     Dressing/Bathing Difficulty no     Home Accessibility stairs to enter home     Number of Stairs, Main Entrance five     Equipment Currently Used at Home none     Readmission within 30 days? No     Do you currently have service(s) that help you manage your care at home? No     Do you take prescription medications? Yes     Do you have prescription coverage? No     Do you have any problems affording any of your prescribed medications? TBD     Is the patient taking medications as prescribed? yes     Who is going to help you get home at discharge? friend     How do you get to doctors appointments? car, drives self     Are you on dialysis? No     Do you take coumadin? No     Discharge Plan A Home     Discharge Plan B Home        Physical Activity    On  average, how many days per week do you engage in moderate to strenuous exercise (like a brisk walk)? 0 days     On average, how many minutes do you engage in exercise at this level? 0 min        Financial Resource Strain    How hard is it for you to pay for the very basics like food, housing, medical care, and heating? Not very hard        Housing Stability    In the last 12 months, was there a time when you were not able to pay the mortgage or rent on time? No     At any time in the past 12 months, were you homeless or living in a shelter (including now)? No        Transportation Needs    In the past 12 months, has lack of transportation kept you from medical appointments or from getting medications? No     In the past 12 months, has lack of transportation kept you from meetings, work, or from getting things needed for daily living? No        Food Insecurity    Within the past 12 months, you worried that your food would run out before you got the money to buy more. Never true     Within the past 12 months, the food you bought just didn't last and you didn't have money to get more. Never true        Stress    Do you feel stress - tense, restless, nervous, or anxious, or unable to sleep at night because your mind is troubled all the time - these days? Not at all        Alcohol Use    Q1: How often do you have a drink containing alcohol? Monthly or less     Q2: How many drinks containing alcohol do you have on a typical day when you are drinking? 5 or 6     Q3: How often do you have six or more drinks on one occasion? Less than monthly                   CM spoke with pt and pt's friend in pt's room. Pt is not current with HH; pt does not use DME at home. Pt may have some financial needs as he is self pya and completed financial assistance paperwork this admission (per his friend). SDOH completed 05/06/24. CM will continue to follow for anticipated d/c needs

## 2024-05-06 NOTE — ASSESSMENT & PLAN NOTE
"Patient's FSGs are controlled on current medication regimen.  Last A1c reviewed-   Lab Results   Component Value Date    HGBA1C 6.6 05/05/2024     Most recent fingerstick glucose reviewed- No results for input(s): "POCTGLUCOSE" in the last 24 hours.  Current correctional scale  Medium  Maintain anti-hyperglycemic dose as follows-   Antihyperglycemics (From admission, onward)      Start     Stop Route Frequency Ordered    05/05/24 2255  insulin aspart U-100 injection 0-10 Units         -- SubQ Every 6 hours PRN 05/05/24 2156          Hold Oral hypoglycemics while patient is in the hospital.  "

## 2024-05-06 NOTE — H&P
[facility]  General Surgery  History & Physical    Patient Name: Fredy Garcia  MRN: [id number]  Admission Date: 5/5/2024  Attending Physician: John Watson MD   Primary Care Provider: Edgardo Dos Santos FNP-C    Patient information was obtained from patient and ER records.     Subjective:     Chief Complaint/Reason for Admission:  Abdominal pain x2 days    History of Present Illness:  Patient is a 59 y.o. male presents with status post lap choly for acute on chronic cholecystitis about a week ago.  He was discharged home was doing well for the 1st couple of days about 4 days after being at home he started having some epigastric right upper quadrant pain upper abdominal pain rib pain.  Lasts for about a day went to the ER.  Workup revealed a transaminitis with a negative CT scan.  Concern for possible retained stone and admitted for observation    Current Facility-Administered Medications   Medication Dose Route Frequency Provider Last Rate Last Admin    acetaminophen tablet 1,000 mg  1,000 mg Oral Q6H PRN Kendal Borjas MD        bisacodyL EC tablet 10 mg  10 mg Oral Daily PRN Kendal Borjas MD        cloNIDine tablet 0.1 mg  0.1 mg Oral Q4H PRN Kendal Borjas MD        dextromethorphan-guaiFENesin  mg/5 ml liquid 10 mL  10 mL Oral Q6H PRN Kendal Borjas MD        dextrose 10% bolus 125 mL 125 mL  12.5 g Intravenous PRN Kendal Borjas MD        dextrose 10% bolus 250 mL 250 mL  25 g Intravenous PRN Kendal Borjas MD        glucagon (human recombinant) injection 1 mg  1 mg Intramuscular PRN Kendal Borjas MD        hydrALAZINE injection 10 mg  10 mg Intravenous Q4H PRN Kendal Borjas MD        HYDROmorphone (PF) injection 2 mg  2 mg Intravenous Q4H PRN Kendal Borjas MD   2 mg at 05/05/24 2157    hydrOXYzine pamoate capsule 100 mg  100 mg Oral Q8H PRN Kendal Borjas MD        insulin aspart U-100 injection 0-10  Units  0-10 Units Subcutaneous Q6H PRN Kendal Borjas MD        lisinopriL tablet 20 mg  20 mg Oral Daily Kendal Borjas MD   20 mg at 05/06/24 0929    melatonin tablet 6 mg  6 mg Oral Nightly PRN Antonio Vargas MD   6 mg at 05/05/24 2239    ondansetron injection 8 mg  8 mg Intravenous Q6H PRN Kendal Borjas MD        pantoprazole EC tablet 40 mg  40 mg Oral Daily Kendal Borjas MD   40 mg at 05/06/24 0929    simethicone chewable tablet 80 mg  1 tablet Oral TID PRN Kendal Borjas MD        sodium chloride 0.9% flush 10 mL  10 mL Intravenous PRN Antonio Vargas MD        traZODone tablet 50 mg  50 mg Oral Nightly PRN Kendal Borjas MD   50 mg at 05/05/24 2239       Review of patient's allergies indicates:   Allergen Reactions    Pcn [penicillins]        Past Medical History:   Diagnosis Date    Anxiety disorder, unspecified     Essential (primary) hypertension     Nicotine dependence     Type 2 diabetes mellitus      Past Surgical History:   Procedure Laterality Date    LAPAROSCOPIC CHOLECYSTECTOMY N/A 4/30/2024    Procedure: CHOLECYSTECTOMY, LAPAROSCOPIC;  Surgeon: John Watson MD;  Location: [facility], [place];  Service: General;  Laterality: N/A;     Family History    None       Tobacco Use    Smoking status: Every Day     Current packs/day: 1.00     Types: Cigarettes    Smokeless tobacco: Never   Substance and Sexual Activity    Alcohol use: Never    Drug use: Not Currently    Sexual activity: Not on file     Review of Systems   Constitutional:  Negative for activity change, appetite change, fatigue and fever.   HENT:  Negative for trouble swallowing.    Respiratory:  Negative for cough and shortness of breath.    Cardiovascular:  Negative for chest pain and palpitations.   Gastrointestinal:  Positive for abdominal pain and nausea. Negative for abdominal distention, blood in stool, constipation and diarrhea.   Genitourinary:  Negative for flank pain.   Musculoskeletal:   Negative for neck pain and neck stiffness.   Neurological:  Negative for weakness.     Objective:     Vital Signs (Most Recent):  Temp: 98 °F (36.7 °C) (05/06/24 1136)  Pulse: 60 (05/06/24 1136)  Resp: 18 (05/06/24 1136)  BP: 138/82 (05/06/24 1136)  SpO2: 95 % (05/06/24 1136) Vital Signs (24h Range):  Temp:  [97.4 °F (36.3 °C)-98 °F (36.7 °C)] 98 °F (36.7 °C)  Pulse:  [60-80] 60  Resp:  [10-26] 18  SpO2:  [92 %-100 %] 95 %  BP: (138-192)/() 138/82     Weight: 91.6 kg (202 lb)  Body mass index is 28.98 kg/m².    Physical Exam  Constitutional:       General: He is not in acute distress.  HENT:      Head: Normocephalic.   Cardiovascular:      Rate and Rhythm: Normal rate and regular rhythm.      Pulses: Normal pulses.   Pulmonary:      Effort: Pulmonary effort is normal. No respiratory distress.      Breath sounds: Normal breath sounds.   Abdominal:      General: Abdomen is flat. There is no distension.      Palpations: Abdomen is soft.      Tenderness: There is no abdominal tenderness.   Musculoskeletal:         General: Normal range of motion.   Skin:     General: Skin is warm.   Neurological:      General: No focal deficit present.      Mental Status: He is oriented to person, place, and time.         Significant Labs:  I have reviewed all pertinent lab results within the past 24 hours.  CBC:   Recent Labs   Lab 05/05/24  1710   WBC 10.34   RBC 5.78   HGB 16.2   HCT 49.0      MCV 84.8   MCH 28.0   MCHC 33.1     BMP:   Recent Labs   Lab 05/06/24  0842   *      K 3.9      CO2 28   BUN 16   CREATININE 0.91   CALCIUM 8.6     CMP:   Recent Labs   Lab 05/06/24  0842   *   CALCIUM 8.6   ALBUMIN 2.6*   PROT 7.3      K 3.9   CO2 28      BUN 16   CREATININE 0.91   ALKPHOS 274*   *   AST 94*   BILITOT 1.5*       Significant Diagnostics:  I have reviewed all pertinent imaging results/findings within the past 24 hours.  CT: I have reviewed all pertinent results/findings  within the past 24 hours and my personal findings are:  Normal CT scan    Assessment/Plan:     Active Diagnoses:    Diagnosis Date Noted POA    PRINCIPAL PROBLEM:  RUQ abdominal pain [R10.11] 05/05/2024 Yes    Transaminitis [R74.01] 05/05/2024 Yes    Type 2 diabetes mellitus [E11.9]  Yes    Essential (primary) hypertension [I10]  Yes    Anxiety disorder, unspecified [F41.9]  Yes      Problems Resolved During this Admission:     VTE Risk Mitigation (From admission, onward)           Ordered     IP VTE HIGH RISK PATIENT  Once         05/05/24 2136     Place sequential compression device  Until discontinued         05/05/24 2136                  His LFTs are elevated mildly knot that is significant for one-week postoperative.  Will get an MRCP to look for truly retained stone.  His hepatitis panel did come back positive and that might be an confounding factor.  Will follow it up and go from there.    John Watson MD  General Surgery  [facility] - [address] [place] [address]

## 2024-05-07 NOTE — PLAN OF CARE
[facility] - [facility]  Discharge Final Note    Primary Care Provider: Edgardo Dos Santos FNP-C    Expected Discharge Date: 5/6/2024    Final Discharge Note (most recent)       Final Note - 05/07/24 0810          Final Note    Assessment Type Final Discharge Note     Anticipated Discharge Disposition Home or Self Care        Post-Acute Status    Discharge Delays None known at this time

## 2024-05-08 LAB — HCV RNA SERPL NAA+PROBE-ACNC: ABNORMAL IU/ML

## 2024-05-14 LAB
OHS QRS DURATION: 86 MS
OHS QTC CALCULATION: 395 MS

## (undated) DEVICE — GLOVE SENSICARE PI SURG 7

## (undated) DEVICE — GLOVE SENSICARE PI SURG 6.5

## (undated) DEVICE — SUT MONOCYRL 4-0 PS2 UND

## (undated) DEVICE — GOWN POLY REINF BRTH SLV XL

## (undated) DEVICE — GLOVE SENSICARE PI GRN 6

## (undated) DEVICE — CART INSERT SCISSOR F 5X34CM

## (undated) DEVICE — SOL NACL IRR 1000ML BTL

## (undated) DEVICE — CORD LAP 10 DISP

## (undated) DEVICE — SET PNEUMOCLEAR HEAT HUM SE HF

## (undated) DEVICE — STRIP MEDI WND CLSR 1/2X4IN

## (undated) DEVICE — ELECTRODE LAPSCP L HOOK 36CM

## (undated) DEVICE — SOL NACL IRR 3000ML

## (undated) DEVICE — Device

## (undated) DEVICE — CANNULA LAP SEAL Z THRD 5X100

## (undated) DEVICE — WARMER BLUE HEAT SCOPE 3-12MM

## (undated) DEVICE — IRRIGATOR ENDOSCOPY DISP.

## (undated) DEVICE — TROCAR KII FIOS ZTHREAD 11X100

## (undated) DEVICE — KIT BASIC RUSH

## (undated) DEVICE — TROCAR ENDO Z THREAD KII 5X100

## (undated) DEVICE — BAG TISS RETRV MONARCH 10MM

## (undated) DEVICE — GLOVE SENSICARE PI GRN 6.5

## (undated) DEVICE — GLOVE SENSICARE PI SURG 6

## (undated) DEVICE — ADHESIVE MASTISOL VIAL 48/BX

## (undated) DEVICE — APPLIER CLIP ENDO MED/LG 10MM